# Patient Record
Sex: FEMALE | Race: WHITE
[De-identification: names, ages, dates, MRNs, and addresses within clinical notes are randomized per-mention and may not be internally consistent; named-entity substitution may affect disease eponyms.]

---

## 2017-06-15 ENCOUNTER — HOSPITAL ENCOUNTER (OUTPATIENT)
Dept: HOSPITAL 75 - RAD | Age: 52
End: 2017-06-15
Attending: NURSE PRACTITIONER
Payer: COMMERCIAL

## 2017-06-15 DIAGNOSIS — M79.661: Primary | ICD-10-CM

## 2017-06-15 NOTE — DIAGNOSTIC IMAGING REPORT
EXAMINATION: Right lower extremity duplex venous ultrasound.



TECHNIQUE: DVT protocol. Multiple sonographic images with color

Doppler and waveform interrogation were performed of the right

lower extremity veins with compression and augmentation

maneuvers.



INDICATION: Right leg pain.



FINDINGS: The right lower extremity veins from the groin to below

the knee veins were examined with normal color-flow,

compressibility and normal waveform demonstrated. The great

saphenous vein is patent.

    



IMPRESSION: 

No evidence of DVT in the right lower extremity.    



Dictated by: 



  Dictated on workstation # [id number]

## 2017-07-19 ENCOUNTER — HOSPITAL ENCOUNTER (OUTPATIENT)
Dept: HOSPITAL 75 - RAD | Age: 52
End: 2017-07-19
Attending: NURSE PRACTITIONER
Payer: COMMERCIAL

## 2017-07-19 DIAGNOSIS — N85.2: Primary | ICD-10-CM

## 2017-07-19 DIAGNOSIS — R10.31: ICD-10-CM

## 2017-07-19 PROCEDURE — 76856 US EXAM PELVIC COMPLETE: CPT

## 2017-07-19 PROCEDURE — 76830 TRANSVAGINAL US NON-OB: CPT

## 2017-07-19 NOTE — DIAGNOSTIC IMAGING REPORT
Transabdominal and transvaginal pelvic ultrasound.



INDICATION: Right lower quadrant pain.



FINDINGS:

The uterus is 4.4 x 3.4 x 2.6 CM. The endometrial stripe is 0.6

CM in thickness. No internal vascularity seen. No focal

myometrial mass is noted. The patient  3 years postmenopausal.



The ovaries are not seen, likely obscured by bowel gas and  small

in size. The urinary bladder appears unremarkable.



IMPRESSION: There is endometrial thickening measuring 0.6 CM.

Underlying uterine hyperplasia, carcinoma or polyp could be

present. Correlate clinically and with endometrial biopsy is

needed. 



Report was called to Milagro Wilkins by ghassan at 11:45 am.



Dictated by: 



  Dictated on workstation # [id number]

## 2017-12-21 ENCOUNTER — HOSPITAL ENCOUNTER (OUTPATIENT)
Dept: HOSPITAL 75 - RAD | Age: 52
End: 2017-12-21
Attending: NURSE PRACTITIONER
Payer: COMMERCIAL

## 2017-12-21 DIAGNOSIS — I82.A11: Primary | ICD-10-CM

## 2017-12-21 DIAGNOSIS — I82.B11: ICD-10-CM

## 2017-12-21 NOTE — DIAGNOSTIC IMAGING REPORT
EXAMINATION: Right upper extremity venous vascular duplex

ultrasound.



INDICATION: Right upper extremity pain.



FINDINGS:

The right internal jugular vein is patent and compressible. The

right subclavian vein demonstrate complete occlusive thrombosis

with thrombus extension into the mid axillary vein, with total 

length of about 10 CM. The brachial vein is patent. The basilic,

the radial, the ulnar and cephalic veins are patent.



IMPRESSION: Occlusive DVT involving the right subclavian vein

extending to the mid axillary vein.



 The findings were discussed personally with Dr. Manjit Gonsales

at 10 AM.



Dictated by: 



  Dictated on workstation # [id number]

## 2017-12-23 ENCOUNTER — HOSPITAL ENCOUNTER (EMERGENCY)
Dept: HOSPITAL 75 - ER | Age: 52
Discharge: HOME | End: 2017-12-23
Payer: COMMERCIAL

## 2017-12-23 VITALS — SYSTOLIC BLOOD PRESSURE: 123 MMHG | DIASTOLIC BLOOD PRESSURE: 87 MMHG

## 2017-12-23 VITALS — BODY MASS INDEX: 20.66 KG/M2 | WEIGHT: 121 LBS | HEIGHT: 64 IN

## 2017-12-23 DIAGNOSIS — E06.3: ICD-10-CM

## 2017-12-23 DIAGNOSIS — Z79.01: ICD-10-CM

## 2017-12-23 DIAGNOSIS — I26.99: Primary | ICD-10-CM

## 2017-12-23 DIAGNOSIS — I82.B11: ICD-10-CM

## 2017-12-23 LAB
ALBUMIN SERPL-MCNC: 3.9 GM/DL (ref 3.2–4.5)
ALT SERPL-CCNC: 24 U/L (ref 0–55)
ANION GAP SERPL CALC-SCNC: 9 MMOL/L (ref 5–14)
AST SERPL-CCNC: 27 U/L (ref 5–34)
BASOPHILS # BLD AUTO: 0 10^3/UL (ref 0–0.1)
BASOPHILS NFR BLD AUTO: 0 % (ref 0–10)
BILIRUB SERPL-MCNC: 0.6 MG/DL (ref 0.1–1)
BUN SERPL-MCNC: 12 MG/DL (ref 7–18)
BUN/CREAT SERPL: 16
CALCIUM SERPL-MCNC: 9.4 MG/DL (ref 8.5–10.1)
CHLORIDE SERPL-SCNC: 106 MMOL/L (ref 98–107)
CO2 SERPL-SCNC: 27 MMOL/L (ref 21–32)
CREAT SERPL-MCNC: 0.75 MG/DL (ref 0.6–1.3)
EOSINOPHIL # BLD AUTO: 0.1 10^3/UL (ref 0–0.3)
EOSINOPHIL NFR BLD AUTO: 1 % (ref 0–10)
ERYTHROCYTE [DISTWIDTH] IN BLOOD BY AUTOMATED COUNT: 11.4 % (ref 10–14.5)
GFR SERPLBLD BASED ON 1.73 SQ M-ARVRAT: > 60 ML/MIN
GLUCOSE SERPL-MCNC: 101 MG/DL (ref 70–105)
LYMPHOCYTES # BLD AUTO: 1.4 X 10^3 (ref 1–4)
LYMPHOCYTES NFR BLD AUTO: 24 % (ref 12–44)
MCH RBC QN AUTO: 33 PG (ref 25–34)
MCHC RBC AUTO-ENTMCNC: 35 G/DL (ref 32–36)
MCV RBC AUTO: 94 FL (ref 80–99)
MONOCYTES # BLD AUTO: 0.5 X 10^3 (ref 0–1)
MONOCYTES NFR BLD AUTO: 9 % (ref 0–12)
NEUTROPHILS # BLD AUTO: 4 X 10^3 (ref 1.8–7.8)
NEUTROPHILS NFR BLD AUTO: 66 % (ref 42–75)
PLATELET # BLD: 253 10^3/UL (ref 130–400)
PMV BLD AUTO: 8.9 FL (ref 7.4–10.4)
POTASSIUM SERPL-SCNC: 4 MMOL/L (ref 3.6–5)
PROT SERPL-MCNC: 6.4 GM/DL (ref 6.4–8.2)
RBC # BLD AUTO: 4.45 10^6/UL (ref 4.35–5.85)
SODIUM SERPL-SCNC: 142 MMOL/L (ref 135–145)
TROPONIN I SERPL-MCNC: < 0.3 NG/ML (ref ?–0.3)
WBC # BLD AUTO: 6 10^3/UL (ref 4.3–11)

## 2017-12-23 PROCEDURE — 71275 CT ANGIOGRAPHY CHEST: CPT

## 2017-12-23 PROCEDURE — 84484 ASSAY OF TROPONIN QUANT: CPT

## 2017-12-23 PROCEDURE — 85025 COMPLETE CBC W/AUTO DIFF WBC: CPT

## 2017-12-23 PROCEDURE — 93005 ELECTROCARDIOGRAM TRACING: CPT

## 2017-12-23 PROCEDURE — 80053 COMPREHEN METABOLIC PANEL: CPT

## 2017-12-23 PROCEDURE — 36415 COLL VENOUS BLD VENIPUNCTURE: CPT

## 2017-12-23 NOTE — XMS REPORT
Megan Díaz DO, FACP Clinical Summary

 Created on: 2015



Marizol Dewitt

External Reference #: [id number]

: 1965

Sex: Female



Demographics







 Address  [address] [place]  [address]

 

 Home Phone  [phone]

 

 Preferred Language  Unknown

 

 Marital Status  D

 

 Temple Affiliation  Unknown

 

 Race  White

 

 Ethnic Group  Not  or 





Author







 Author  User, [employer]  Megan Díaz DO, JAVIERP

 

 Address  Unknown

 

 Phone  [phone]







Allergies, Adverse Reactions, Alerts







 Allergy Name  Reaction Description  Start Date  Severity  Status  Provider

 

 PENICILLIN  Rash    Critical  Active  Megan Díaz







Conditions or Problems







 Problem Name  Problem Code  Onset Date  Status  Entry Date  Provider  Comment  
Standard Description  Annotate

 

 UNSPECIFIED PERIPHERAL VASCULAR DISEASE  443.9    Resolved    Megan Díaz     Peripheral vascular disease, unspecified   

 

 WELL WOMAN  V70.0    Resolved    Megan Díaz     
Routine general medical examination at a health care facility   

 

 RAYNAUD'S SYNDROME  443.0    Active    Megan Díaz     Raynaud's syndrome   

 

 AUTOIMMUNE DISEASE NOT ELSEWHERE  CLASSIFIED  279.49    Resolved  
  Megan Díaz     Autoimmune disease, not elsewhere 
classified   

 

 POLYARTHRALGIA  719.49    Active    Megan Díaz  
   Pain in joint involving multiple sites   

 

 HYPERCHOLESTEROLEMIA  272.0    Active    Megan Díaz     Pure hypercholesterolemia   

 

 NEUROPATHY, IDIOPATHIC PERIPHERAL  356.9    Active    
Megan Díaz     Unspecified idiopathic peripheral neuropathy   

 

 VITAMIN B12 DEFICIENCY  266.2    Resolved    Megan Díaz     Other B-complex deficiencies   

 

 RHEUMATOID ARTHRITIS  714.0    Resolved    Megan Díaz     Rheumatoid arthritis   

 

 BRONCHITIS  490    Resolved    Megan Díaz     
Bronchitis, not specified as acute or chronic   

 

 CONNECTIVE TISSUE DISEASE, UNDIFFERENTIATED  710.9    Active    Megan Díaz     Unspecified diffuse connective tissue disease   







Medication List







 Medication  Instructions  Start Date  Stop Date  Generic Name  NDC  Status  
Provider  Patient Instruction

 

 ESTRIOL/ESTRADIOL (80/20%) 0.3MG/0.1ML CREAM  APPLY 0.5ML TOPICALLY EVERY DAY  
     ESTRIOL/ESTRADIOL (80/20%) 0.3MG/0.1ML CREAM     Active  Megan Díaz   

 

 PROGESTERONE CREAM 60MG/GM  APPLY ONE HALF ML TOPICALLY AT HS AS DIRECTED  2015     PROGESTERONE CREAM 60MG/GM     Active  Megan Díaz   

 

 TESTOSTERONE 0.2%/JASON CREAM  APPLY 0.25 ML TOPICALLY ONE DAILY AS DIRECTED  
     TESTOSTERONE 0.2%/JASON CREAM     Active  Megan Díaz   

 

 CLARITIN-D 12 HOUR XR12H-TAB  1 PO BID PRN       LORATADINE-
PSEUDOEPHEDRINE [id number]  [id number]  Active  Megandrake Díaz   

 

 MULTIVITAMINS  TABS  1 po daily    MULTIPLE VITAMIN  
[id number]  No Longer Active  Megan Jahaira Díaz   

 

 PLAQUENIL 200 MG TAB  1.5 po QD       HYDROXYCHLOROQUINE SULFATE  
[id number]  No Longer Active  Megandrake Díaz   

 

 NABUMETONE 750 MG TABS  1 PO BID       NABUMETONE  [id number]  No 
Longer Active  Megandrake Díaz   

 

 NIFEDIPINE 30 MG XR24H-TAB  1 PO daily    NIFEDIPINE  
[id number]  No Longer Active  Megan Jahaira Díaz   

 

 TUSSIONEX PENNKINETIC ER 8-10 MG/5ML LQCR  1 tsp PO BID prn cough    CHLORPHENIRAMINE-HYDROCODONE  [id number]  No Longer Active  Megan 
Jahaira Díaz   

 

 KEFLEX 500 MG CAP  1 PO TID for 7 days    2011/01/10  CEPHALEXIN  
[id number]  No Longer Active  Megan Jahaira Díaz   

 

 TUSSIONEX PENNKINETIC ER 8-10 MG/5ML LQCR  1 tsp PO TID prn cough    
2011/01/10  CHLORPHENIRAMINE-HYDROCODONE  [id number]  No Longer Active  Megan 
Jahaira Díaz   

 

 FELDENE 10 MG CAPS  2 PO daily at night        PIROXICAM  [id number]  No 
Longer Active  Megandrake Florese Bre   

 

 ALPRAZOLAM 0.25 MG TABS  1 po prn anxiety       ALPRAZOLAM  
[id number]  Active  Megan Díaz   







Vital Signs







 Date  Name  Value  Unit  Range  Description

 

   blood pressure, diastolic - 8462-4  60  mm[Hg]     BP pan[place]

 

   blood pressure, systolic - 8480-6  120  mm[Hg]     BP sys

 

   pulse rate E&M - 8867-4  60  /min     Heart rate

 

   respiratory rate E&M - 9279-1  14  /min     Resp rate

 

   weight E&M - 3141-9  120  [lb_av]     Weight Measured







Diagnostic Results







 Date  Name  Value  Unit  Range  Description

 

 Clinical Lists Update: CBC,CMP,ESR,Ferritin,FLP - Chemistry 

 

   urea nitrogen, blood  13  mg/dL      

 

   alanine aminotransferase (SGPT), serum  21  U/L      

 

   protein, total, serum  6.7  g/dL      

 

   glucose, plasma fasting  91  mg/dL      

 

   triglyceride, serum, fasting  204  mg/dL      

 

   alkaline phosphatase, serum  43  U/L      

 

   potassium, serum  3.9  mmol/L      

 

   sodium, serum  137  mmol/L      

 

   calcium, serum  9.4  mg/dL      

 

   aspartate aminotransferase (SGOT), serum  24  U/L      

 

   chloride, serum  100  mmol/L      

 

   Estimated Glomerular Filtration Rate (calc)  73  mL/min/1.73m2    
  

 

   cholesterol, serum  258  mg/dL      

 

   albumin, serum  4.5  g/dL      

 

   carbon dioxide, venous blood  27  mmol/L      

 

   ferritin, serum  15  ng/mL      

 

   creatinine, serum  0.92  mg/dL      

 

   bilirubin, serum, total  0.6  mg/dL      

 

 Clinical Lists Update: CBC,CMP,ESR,Ferritin,FLP - Hematology 

 

   hematocrit, blood  42.1  %      

 

   erythrocyte (RBC) count  4.25  10*6/mm3      

 

   platelet count  304  10*3/mm3      

 

   erythrocyte sedimentation rate  9  mm/h      

 

   leukocyte count, blood  5.5  10*3/mm3      

 

   hemoglobin, blood  14.2  g/dL      

 

   red blood cell distribution width  13.0  %      

 

   mean corpuscular volume, RBC  99.0  fL      

 

 Clinical Lists Update: CBC,CMP,TSH,FREE T4,HGA1C,FERRITIN - Chemistry 

 

   albumin, serum  4.1  g/dL      

 

   alkaline phosphatase, serum  42  U/L      

 

   urea nitrogen, blood  17  mg/dL      

 

   calcium, serum  8.9  mg/dL      

 

   chloride, serum  101  mmol/L      

 

   creatinine, serum  0.96  mg/dL      

 

   ferritin, serum  13  ng/mL      

 

   thyroxine, serum, free  1.3  ng/dL      

 

   hemoglobin A1C, blood, as % of total hemoglobin  5.3  %      

 

   thyroid stimulating hormone, serum  1.35  u[iU]/mL      

 

   potassium, serum  4.4  mmol/L      

 

   protein, total, serum  6.4  g/dL      

 

   aspartate aminotransferase (SGOT), serum  19  U/L      

 

   alanine aminotransferase (SGPT), serum  15  U/L      

 

   bilirubin, serum, total  0.6  mg/dL      

 

   sodium, serum  135  mmol/L      

 

   glucose, plasma fasting  79  mg/dL      

 

   Estimated Glomerular Filtration Rate (calc)  105  mL/min/1.73m2   
   

 

   carbon dioxide, venous blood  25  mmol/L      

 

 Clinical Lists Update: CBC,CMP,TSH,FREE T4,HGA1C,FERRITIN - Hematology 

 

   erythrocyte (RBC) count  4.04  10*6/mm3      

 

   hematocrit, blood  41.0  %      

 

   red blood cell distribution width  12.4  %      

 

   leukocyte count, blood  4.9  10*3/mm3      

 

   platelet count  298  10*3/mm3      

 

   mean corpuscular volume, RBC  101.4  fL      

 

   hemoglobin, blood  13.9  g/dL      

 

 Clinical Lists Update: ESR - Hematology 

 

   erythrocyte sedimentation rate  2  mm/h      

 

 Clinical Lists Update: [facility]  - Chemistry 

 

   cholesterol/HDL ratio, serum, percent  2.4         

 

   triglyceride, serum, fasting  84  mg/dL      

 

   HDL cholesterol, serum  105  mg/dL      

 

   cholesterol, serum  254  mg/dL      

 

   LDL cholesterol, serum  132  mg/dL      







Encounters







 Code  Encounter  Date  Provider  Facility

 

 CPT-68616  Ofc Vst, Est Level IV   17:15:07 CDT  Megan Díaz, DO, FACP

 

 CPT-82224  Ofc Vst, Est Level IV   17:13:37 CDT  Megan Díaz, DO, FACP

 

 CPT-00261  Ofc Vst, Est Level III   12:50:11 CST  Megan Díaz, DO, FACP

 

 CPT-31486  Ofc Vst, Est Level III   10:07:15 CST  Megan Díaz, DO, FACP

 

 CPT-57026  Ofc Vst, Est Level IV   11:04:36 CST  Megan Díaz, DO, FACP

 

 CPT-13993  Ofc Vst, Est Level IV   11:27:21 CST  Megan Díaz, DO, FACP

 

 CPT-82209  Ofc Vst, New Level IV   15:18:55 CST  Megan Díaz, DO, FACP

## 2017-12-23 NOTE — ED CHEST PAIN
General


Chief Complaint:  Chest Pain


Stated Complaint:  CP AND SOB, DVT


Nursing Triage Note:  


ARRIVED VIA AMB TO ROOM 05.  PT HAS A KNOWN RIGHT SUBCLAVIAN BLOOD CLOT THAT 

SHE 


IS TAKING XERELTO FOR.  PT PRESENTS TODAY WITH CHEST PAIN AND SOA STARTING 


YESTERDY.


Nursing Sepsis Screen:  No Definite Risk


Source:  patient, RN/MD, other


Exam Limitations:  no limitations





History of Present Illness


Time seen by provider:  11:10


Initial Comments


Patient presents to ER by private conveyance with chief complaint that she is 

having some chest pain today. 2 days ago she was having some right arm swelling 

and pain and so she went to her primary care doctor who diagnosed her with a 

subclavian thrombosis on the right side and put her on Xarelto. The swelling 

has already improved but her pain has worsened today. Spoke with her primary 

care physician and he was concerned she might be experiencing a pulmonary 

embolism. She's having some shortness of breath. She is on hormone replacement 

therapy but does not smoke nor have any history of trauma recently. She has no 

personal history of cancer and she says her Pap smear, mammogram and 

colonoscopy are all up-to-date. She has no unexpected weight change. 


Discussed the case with Dr. Gonsales her primary care physician and he says 

that she has now had protein CNS, factor V Leiden other labs drawn and sent 

off. His thoughts were thoracic syndrome versus pulmonary embolism and if it is 

a PE there are certainly some invasive thrombectomy that may be done at at 

tertiary center so we should prove it and her out even though her vitals are 

not otherwise affected. His feeling is that the estrogen therapy may play a 

part in all his meds why he is asked her to stop that. He says she should be on 

day 3 of her Xarelto so is not exactly certain whether to call Xarelto failure 

if she did develop pulmonary embolism.





Allergies and Home Medications


Allergies


Coded Allergies:  


     Penicillins (Unverified  Allergy, Unknown, RASH, 17)





Home Medications


Alprazolam 0.25 Mg Tablet, (Reported)


Dextroamphetamine/Amphetamine 20 Mg Tablet, (Reported)


Loratadine/Pseudoephedrine 1 Each Tab.er.24h, (Reported)


Progesterone,Micronized 200 Mg Capsule, (Reported)


Thyroid,Pork 60 Mg Tablet, (Reported)





Review of Systems


Constitutional:  No chills, No diaphoresis


EENTM:  No Blurred Vision, No Eye Pain


Respiratory:  Denies Cough, Shortness of Air


Cardiovascular:  See HPI, Chest Pain, Denies Lightheadedness, Denies 

Palpitations, Denies Syncope


Gastrointestinal:  Denies Abdomen Distended, Denies Abdominal Pain


Genitourinary:  Denies Burning, Denies Discharge


Musculoskeletal:  No back pain, No joint pain


Skin:  No pruritus, No rash


Psychiatric/Neurological:  Denies Headache, Denies Numbness





Past Medical-Social-Family Hx


Patient Social History


Alcohol Use:  Denies Use


Recreational Drug Use:  No


Smoking Status:  Never a Smoker


Recent Foreign Travel:  No


Contact w/Someone Who Travel:  No


Recent Infectious Disease Expo:  No





Immunizations Up To Date


Date of Influenza Vaccine:  2011





Respiratory


History of Respiratory Disorde:  No





Cardiovascular


History of Cardiac Disorders:  No





Neurological


History of Neurological Disord:  No





Reproductive System


Hx Reproductive Disorders:  Yes (MENORRHAGIA)





Genitourinary


History of Genitourinary Disor:  No





Gastrointestinal


History of Gastrointestinal Di:  No





Musculoskeletal


History of Musculoskeletal Dis:  Yes (CONNECTIVE TISSUE DISEASE)





Endocrine


History of Endocrine Disorders:  Yes (HASHIMOTOS)





Psychosocial


History of Psychiatric Problem:  No





Integumentary


History of Skin or Integumenta:  No





Blood Transfusions


History of Blood Disorders:  No





Physical Exam


Vital Signs





Vital Sign - Last 12Hours








 17





 11:02


 


Temp 98.0


 


Pulse 71


 


Resp 18


 


B/P (MAP) 126/82 (97)


 


Pulse Ox 99


 


O2 Delivery Room Air





Capillary Refill : Less Than 3 Seconds


General Appearance:  No Apparent Distress, WD/WN, Anxious


HEENT:  PERRL/EOMI, Pharynx Normal, Moist Mucous Membranes


Neck:  Full Range of Motion, Supple


Respiratory:  Chest Non Tender, Lungs Clear, Normal Breath Sounds, No Accessory 

Muscle Use, No Respiratory Distress


Cardiovascular:  Regular Rate, Rhythm, No Edema, No Gallop, No Murmur, Normal 

Peripheral Pulses


Gastrointestinal:  Non Tender, Soft


Neurologic/Psychiatric:  Alert, Oriented x3


Skin:  Normal Color, Warm/Dry





Progress/Results/Core Measures


Results/Orders


Lab Results





Laboratory Tests








Test


  17


11:44 Range/Units


 


 


White Blood Count


  6.0 


  4.3-11.0


10^3/uL


 


Red Blood Count


  4.45 


  4.35-5.85


10^6/uL


 


Hemoglobin 14.7  11.5-16.0  G/DL


 


Hematocrit 42  35-52  %


 


Mean Corpuscular Volume 94  80-99  FL


 


Mean Corpuscular Hemoglobin 33  25-34  PG


 


Mean Corpuscular Hemoglobin


Concent 35 


  32-36  G/DL


 


 


Red Cell Distribution Width 11.4  10.0-14.5  %


 


Platelet Count


  253 


  130-400


10^3/uL


 


Mean Platelet Volume 8.9  7.4-10.4  FL


 


Neutrophils (%) (Auto) 66  42-75  %


 


Lymphocytes (%) (Auto) 24  12-44  %


 


Monocytes (%) (Auto) 9  0-12  %


 


Eosinophils (%) (Auto) 1  0-10  %


 


Basophils (%) (Auto) 0  0-10  %


 


Neutrophils # (Auto) 4.0  1.8-7.8  X 10^3


 


Lymphocytes # (Auto) 1.4  1.0-4.0  X 10^3


 


Monocytes # (Auto) 0.5  0.0-1.0  X 10^3


 


Eosinophils # (Auto)


  0.1 


  0.0-0.3


10^3/uL


 


Basophils # (Auto)


  0.0 


  0.0-0.1


10^3/uL


 


Sodium Level 142  135-145  MMOL/L


 


Potassium Level 4.0  3.6-5.0  MMOL/L


 


Chloride Level 106    MMOL/L


 


Carbon Dioxide Level 27  21-32  MMOL/L


 


Anion Gap 9  5-14  MMOL/L


 


Blood Urea Nitrogen 12  7-18  MG/DL


 


Creatinine


  0.75 


  0.60-1.30


MG/DL


 


Estimat Glomerular Filtration


Rate > 60 


   


 


 


BUN/Creatinine Ratio 16   


 


Glucose Level 101    MG/DL


 


Calcium Level 9.4  8.5-10.1  MG/DL


 


Total Bilirubin 0.6  0.1-1.0  MG/DL


 


Aspartate Amino Transf


(AST/SGOT) 27 


  5-34  U/L


 


 


Alanine Aminotransferase


(ALT/SGPT) 24 


  0-55  U/L


 


 


Alkaline Phosphatase 52    U/L


 


Troponin I < 0.30  <0.30  NG/ML


 


Total Protein 6.4  6.4-8.2  GM/DL


 


Albumin 3.9  3.2-4.5  GM/DL








My Orders





Orders - EDSON ADAME


Ekg Tracing (17 11:05)


Continuous Ekg Monitoring (17 11:05)


Ct Angio Chest W (17 11:31)


Saline Lock/Iv-Start (17 11:31)


Ns Iv 1000 Ml (Sodium Chloride 0.9%) (17 11:31)


Cbc With Automated Diff (17 11:31)


Comprehensive Metabolic Panel (17 11:31)


Troponin I (17 11:31)


Iohexol Injection (Omnipaque 350 Mg/Ml 1 (17 12:15)


Ns (Ivpb) (Sodium Chloride 0.9% Ivpb Bag (17 12:15)





Medications Given in ED





Current Medications








 Medications  Dose


 Ordered  Sig/Johnathon


 Route  Start Time


 Stop Time Status Last Admin


Dose Admin


 


 Iohexol  150 ml  ONCE  ONCE


 IV  17 12:15


 17 12:17 DC 17 12:20


125 ML


 


 Sodium Chloride  100 ml  ONCE  ONCE


 IV  17 12:15


 17 12:17 DC 17 12:21


80 ML


 


 Sodium Chloride  1,000 ml @ 


 0 mls/hr  Q0M ONCE


 IV  17 11:31


 17 11:33 DC 17 11:42


1,000 MLS/HR








Vital Signs/I&O





Vital Sign - Last 12Hours








 17





 11:02


 


Temp 98.0


 


Pulse 71


 


Resp 18


 


B/P (MAP) 126/82 (97)


 


Pulse Ox 99


 


O2 Delivery Room Air














Blood Pressure Mean:  97








Progress Note :  


   Time:  13:48


Progress Note


Patient is on day 3 of her loading dose of Xarelto so despite having pulmonary 

embolisms is not immediately necessary does heparinize her. We will discuss 

anticoagulation as well as possible embolectomy with CT surgery.





ECG


Initial ECG Impression Date:  Dec 23, 2017


Initial ECG Impression Time:  11:06


Initial ECG Rate:  74


Initial ECG Rhythm:  Normal Sinus


Initial ECG Intervals:  Normal


Initial ECG Impression:  Normal, Nonspecific Changes


Initial ECG Comparisson:  No Previous ECG Available


Comment


No T-wave elevation or depression.





Diagnostic Imaging





   Diagonstic Imaging:  CT (angio)


   Plain Films/CT/US/NM/MRI:  chest


Comments


Pulmonary embolisms bilateral lower lungs. 6 mm nodule needing follow-up.


 VIA [place], [place]





NAME:   ANATOLY GONZALES


MED REC#:   [id number]


ACCOUNT#:   [id number]


PT STATUS:   REG ER


:   1965


PHYSICIAN:   EDSON ADAME MD


ADMIT DATE:   17/ER


 ***Draft***


Date of Exam:17





CT ANGIO CHEST W








CLINICAL INDICATION: Followup DVT of right subclavian vein.


Patient has chest pain with coughing.





EXAM: CT angiogram of the chest performed with 125  cc Omnipaque


350 IV contrast. Coronal and oblique MIP images of the


vasculature were created to better evaluate anatomy.  





COMPARISON: None.





FINDINGS:


There is contrast bolus within the left subclavian vein, left


innominate vein, and superior vena cava which causes streak


artifact obscuring portions of the aortic arch and great vessels.


Visualized portions of the right subclavian artery, bilateral CCA


and bilateral cervical vertebral arteries are patent. The right


subclavian vein has no contrast within it to evaluate.





There are partially occlusive intravascular thrombi seen within


the posterior medial subsegmental branch of the right lower lobe


pulmonary artery. There is also a partially occlusive


intravascular thrombus within the anterior subsegmental branch of


the left lower lobe. There is no thoracic aortic dissection or


aneurysm. 





There is mild dependent atelectasis involving the posterior


aspects of both lungs. There is a 6 mm soft tissue nodule


involving the lateral aspect of right lower lobe seen on series


4, image 84. Otherwise, the remainder of the lungs are clear.


Pulmonary bronchi are unremarkable. The thyroid gland is


unremarkable as visualized. There is no significant mediastinal


or hilar lymphadenopathy. There is no axillary lymphadenopathy.


Bilateral subglandular breast implants are seen.





The visualized portions of the upper abdominal structures are


unremarkable. Bone show no significant acute process.





IMPRESSION:


1:  There are partially occlusive pulmonary emboli/intravascular


thrombus within the subsegmental branches of the right and left


lower lobes.





2: There is a 6 mm nodule within the lateral aspect of right


lower lobe. Followup chest CT scan in six months is suggested to


evaluate for stability or resolution.





Results of this report were discussed with Dr. Edson Adame via


the telephone on 2017 at 1245 hrs.





CRITICAL FINDING

















  Dictated on workstation # [id number]








Dict:   17 1239


Trans:   17 1257


KB [id number]





Interpreted by:     RAMSEY SHAFFER MD


Electronically signed by:


   Reviewed:  Reviewed by Me





Consults


Consults :  


Consults Notes


[facility]: CT Surgeon consult


Triage coordinator 1315: Will have a Triage RN call you back. 


1345: Checked back in. 


Triage RN: 1405: Will have the physician review the images and then call us 

back.


1455: Dr Gleason: He feels that the clots are in the acute phase when a 

migration from the subclavian thrombus is most likely so he would consider this 

a failure of the Xarelto and would recommend we switch her to develop this as 

well as cover her until you get up to therapeutic dosing with Lovenox 1 mg/kg 

twice a day. He would also suggested we check a factor X a level in 2-3 days on 

the Lovenox. He would continue treatment with L acquits for 3-6 months. 3 

months as long as she was asymptomatic for the last month area longer she has 

any problems. As far as the nodule; it is too small for any kind of diagnostic 

imaging or biopsy so he would repeat a CT scan in 6-12 months a stoma level of 

the patient's anxiety. Also he feels that since the patient is a lifelong 

nonsmoker that adenocarcinoma would be the most likely cancer and this is often 

as much as 20% false-negative on PET scans.


Because the patient has normal vitals and is otherwise asymptomatic except for 

mild shortness of breath and mild chest pain he recommends against TPA or 

embolectomy as the risks outweigh the benefits.





Departure


Communication (PCP)


Discussed case lab imaging findings and Dr. Gleason's recommendations with the 

PCP. Discussed the need for repeat CT scan in 612 months as well as check after 

10 a levels in 2-3 days on the Lovenox. Discussed anticoagulation strategy. 

Discussed getting the CT scans that are already planned to workup vena cava 

compression or thoracic outlet syndrome.





Impression


Impression:  


 Primary Impression:  


 Pulmonary embolism


 Qualified Codes:  I26.99 - Other pulmonary embolism without acute cor pulmonale


 Additional Impression:  


 Acute embolism from right subclavian vein


Disposition:  01 HOME, SELF-CARE


Condition:  Stable





Departure-Patient Inst.


Decision time for Depature:  15:17


Referrals:  


RONNY GONSALES DO (PCP)


Primary Care Physician








CATARINO GONSALES, MYA (Family)


Primary Care Physician


Patient Instructions:  Pulmonary Embolism (Blood Clot in the Lungs) (DC)





Add. Discharge Instructions:  


Make sure drinking plenty of water and obtain the Lovenox to be taken 55 mg 

subcutaneously twice a day. 2-3 days after he start the Lovenox you should 

follow up with your primary care physician to have blood drawn for a factor X a 

level.


Start the Eliquis tonight and take 2 capsules twice a day for 7 days. Then take 

one capsule twice a day. Plan to follow up with your primary care physician in 

one to 2 weeks. 


Plan to repeat a CT scan in 6-12 months for the right pulmonary nodule seen 

today on CT scan.


Return to the ER if you have acute shortness of breath, chest pain or worsening 

symptoms.








All discharge instructions reviewed with patient and/or family. Voiced 

understanding.


Scripts


Apixaban (Eliquis) 5 Mg Tablet


5 MG PO BID for 30 Days, #60 TAB 0 Refills


   Prov: EDSON ADAME         17 


Apixaban (Eliquis) 5 Mg Tablet


10 MG PO BID for 7 Days, #28 TAB 0 Refills


   2 TABLETS TWICE DAILY X 7 DAYS


   Prov: EDSON ADAME         17 


Enoxaparin Sodium (Lovenox) 60 Mg/0.6 Ml Syringe


55 MG SQ Q12H for 14 Days, #28 SYRINGE 0 Refills


   Prov: EDSON ADAME         17





Copy


Copies To 1:   RONNY GONSALES DO











EDSON ADAME Dec 23, 2017 11:27

## 2017-12-23 NOTE — XMS REPORT
Megan Díaz DO, FACP Clinical Summary

 Created on: 2015



Marizol Dewitt

External Reference #: [id number]

: 1965

Sex: Female



Demographics







 Address  [address] [place]  [address]

 

 Home Phone  [email or web address]

 

 Preferred Language  Unknown

 

 Marital Status  D

 

 Sikhism Affiliation  Unknown

 

 Race  White

 

 Ethnic Group  Not  or 





Author







 Author  User, N4MD  Megan Díaz DO, FACP

 

 Address  Unknown

 

 Phone  [phone]







Allergies, Adverse Reactions, Alerts







 Allergy Name  Reaction Description  Start Date  Severity  Status  Provider

 

 PENICILLIN  Rash    Critical  Active  Megan Díaz







Conditions or Problems







 Problem Name  Problem Code  Onset Date  Status  Entry Date  Provider  Comment  
Standard Description  Annotate

 

 UNSPECIFIED PERIPHERAL VASCULAR DISEASE  443.9    Resolved    Megan Díaz     Peripheral vascular disease, unspecified   

 

 WELL WOMAN  V70.0    Resolved    Megan Díaz     
Routine general medical examination at a health care facility   

 

 RAYNAUD'S SYNDROME  443.0    Active    Megan Díaz     Raynaud's syndrome   

 

 AUTOIMMUNE DISEASE NOT ELSEWHERE  CLASSIFIED  279.49    Resolved  
  Megan Díaz     Autoimmune disease, not elsewhere 
classified   

 

 POLYARTHRALGIA  719.49    Active    Megan Díaz  
   Pain in joint involving multiple sites   

 

 HYPERCHOLESTEROLEMIA  272.0    Active    Megan Díaz     Pure hypercholesterolemia   

 

 NEUROPATHY, IDIOPATHIC PERIPHERAL  356.9    Active    
Meagn Díaz     Unspecified idiopathic peripheral neuropathy   

 

 VITAMIN B12 DEFICIENCY  266.2    Resolved    Megan Díaz     Other B-complex deficiencies   

 

 RHEUMATOID ARTHRITIS  714.0    Resolved    Megan Díaz     Rheumatoid arthritis   

 

 BRONCHITIS  490    Resolved    Megan Díaz     
Bronchitis, not specified as acute or chronic   

 

 CONNECTIVE TISSUE DISEASE, UNDIFFERENTIATED  710.9    Active    Megan Díaz     Unspecified diffuse connective tissue disease   







Medication List







 Medication  Instructions  Start Date  Stop Date  Generic Name  NDC  Status  
Provider  Patient Instruction

 

 ESTRIOL/ESTRADIOL (80/20%) 0.3MG/0.1ML CREAM  APPLY 0.5ML TOPICALLY EVERY DAY  
     ESTRIOL/ESTRADIOL (80/20%) 0.3MG/0.1ML CREAM     Active  Megandrake Díaz   

 

 PROGESTERONE CREAM 60MG/GM  APPLY ONE HALF ML TOPICALLY AT HS AS DIRECTED  2015     PROGESTERONE CREAM 60MG/GM     Active  Megan Díaz   

 

 TESTOSTERONE 0.2%/JASON CREAM  APPLY 0.25 ML TOPICALLY ONE DAILY AS DIRECTED  
     TESTOSTERONE 0.2%/JASON CREAM     Active  Megandrake Díaz   

 

 CLARITIN-D 12 HOUR XR12H-TAB  1 PO BID PRN       LORATADINE-
PSEUDOEPHEDRINE [id number]  [id number]  Active  Megandrake Díaz   

 

 MULTIVITAMINS  TABS  1 po daily    MULTIPLE VITAMIN  
[id number]  No Longer Active  Megan Jahaira Díaz   

 

 PLAQUENIL 200 MG TAB  1.5 po QD       HYDROXYCHLOROQUINE SULFATE  
[id number]  No Longer Active  Megan Jahaira Díaz   

 

 NABUMETONE 750 MG TABS  1 PO BID       NABUMETONE  [id number]  No 
Longer Active  Megan Jahaira Díaz   

 

 NIFEDIPINE 30 MG XR24H-TAB  1 PO daily    NIFEDIPINE  
[id number]  No Longer Active  Megan Jahaira Díaz   

 

 TUSSIONEX PENNKINETIC ER 8-10 MG/5ML LQCR  1 tsp PO BID prn cough    CHLORPHENIRAMINE-HYDROCODONE  [id number]  No Longer Active  Megan 
Jahaira Díaz   

 

 KEFLEX 500 MG CAP  1 PO TID for 7 days    2011/01/10  CEPHALEXIN  
[id number]  No Longer Active  Megan Jahaira Díaz   

 

 TUSSIONEX PENNKINETIC ER 8-10 MG/5ML LQCR  1 tsp PO TID prn cough    
2011/01/10  CHLORPHENIRAMINE-HYDROCODONE  [id number]  No Longer Active  Megan 
Jahaira Díaz   

 

 FELDENE 10 MG CAPS  2 PO daily at night        PIROXICAM  [id number]  No 
Longer Active  Megan Díaz   

 

 ALPRAZOLAM 0.25 MG TABS  1 po prn anxiety       ALPRAZOLAM  
[id number]  Active  Megan Jahaira Huiner   







Vital Signs







 Date  Name  Value  Unit  Range  Description

 

   blood pressure, diastolic - 8462-4  60  mm[Hg]     BP paniagua

 

   blood pressure, systolic - 8480-6  120  mm[Hg]     BP sys

 

   pulse rate E&M - 8867-4  60  /min     Heart rate

 

   respiratory rate E&M - 9279-1  14  /min     Resp rate

 

   weight E&M - 3141-9  120  [lb_av]     Weight Measured







Diagnostic Results







 Date  Name  Value  Unit  Range  Description

 

 Clinical Lists Update: CBC,CMP,TSH,FREE T4,HGA1C,FERRITIN - Chemistry 

 

   Estimated Glomerular Filtration Rate (calc)  105  mL/min/1.73m2   
   

 

   glucose, plasma fasting  79  mg/dL      

 

   albumin, serum  4.1  g/dL      

 

   alkaline phosphatase, serum  42  U/L      

 

   urea nitrogen, blood  17  mg/dL      

 

   calcium, serum  8.9  mg/dL      

 

   chloride, serum  101  mmol/L      

 

   carbon dioxide, venous blood  25  mmol/L      

 

   creatinine, serum  0.96  mg/dL      

 

   ferritin, serum  13  ng/mL      

 

   thyroxine, serum, free  1.3  ng/dL      

 

   hemoglobin A1C, blood, as % of total hemoglobin  5.3  %      

 

   thyroid stimulating hormone, serum  1.35  u[iU]/mL      

 

   potassium, serum  4.4  mmol/L      

 

   sodium, serum  135  mmol/L      

 

   bilirubin, serum, total  0.6  mg/dL      

 

   alanine aminotransferase (SGPT), serum  15  U/L      

 

   aspartate aminotransferase (SGOT), serum  19  U/L      

 

   protein, total, serum  6.4  g/dL      

 

 Clinical Lists Update: CBC,CMP,TSH,FREE T4,HGA1C,FERRITIN - Hematology 

 

   mean corpuscular volume, RBC  101.4  fL      

 

   hemoglobin, blood  13.9  g/dL      

 

   hematocrit, blood  41.0  %      

 

   red blood cell distribution width  12.4  %      

 

   leukocyte count, blood  4.9  10*3/mm3      

 

   erythrocyte (RBC) count  4.04  10*6/mm3      

 

   platelet count  298  10*3/mm3      

 

 Clinical Lists Update: ESR - Hematology 

 

   erythrocyte sedimentation rate  2  mm/h      

 

 Clinical Lists Update: [facility]  - Chemistry 

 

   cholesterol, serum  254  mg/dL      

 

   cholesterol/HDL ratio, serum, percent  2.4         

 

   LDL cholesterol, serum  132  mg/dL      

 

   triglyceride, serum, fasting  84  mg/dL      

 

   HDL cholesterol, serum  105  mg/dL      







Encounters







 Code  Encounter  Date  Provider  Facility

 

 CPT-67853  Ofc Vst, Est Level IV   17:15:07 CDT  Megan Díaz, DO, FACP

 

 CPT-84846  Ofc Vst, Est Level IV   17:13:37 CDT  Megan Díaz, DO, FACP

 

 CPT-02303  Ofc Vst, Est Level III   12:50:11 CST  Megan Díaz, DO, FACP

 

 CPT-71912  Ofc Vst, Est Level III   10:07:15 CST  Megan Díaz, DO, FACP

 

 CPT-53394  Ofc Vst, Est Level IV   11:04:36 CST  Megan Díaz, DO, FACP

 

 CPT-53915  Ofc Vst, Est Level IV   11:27:21 CST  Megan Díaz, DO, FACP

 

 CPT-84979  Ofc Vst, New Level IV   15:18:55 CST  Megan Díaz, DO, FACP

## 2017-12-23 NOTE — XMS REPORT
Megan Díaz DO, FACP Clinical Summary

 Created on: 2015



Marizol Dewitt

External Reference #: [id number]

: 1965

Sex: Female



Demographics







 Address  [address] [place]  [address]

 

 Home Phone  [email or web address]

 

 Preferred Language  Unknown

 

 Marital Status  D

 

 Rastafarian Affiliation  Unknown

 

 Race  White

 

 Ethnic Group  Not  or 





Author







 Author  User, [employer]  Megan Díaz DO, FACP

 

 Address  Unknown

 

 Phone  [phone]







Allergies, Adverse Reactions, Alerts







 Allergy Name  Reaction Description  Start Date  Severity  Status  Provider

 

 PENICILLIN  Rash    Critical  Active  Megan Díaz







Conditions or Problems







 Problem Name  Problem Code  Onset Date  Status  Entry Date  Provider  Comment  
Standard Description  Annotate

 

 UNSPECIFIED PERIPHERAL VASCULAR DISEASE  443.9    Resolved    Megan Díaz     Peripheral vascular disease, unspecified   

 

 WELL WOMAN  V70.0    Resolved    Megan Díaz     
Routine general medical examination at a health care facility   

 

 RAYNAUD'S SYNDROME  443.0    Active    Megan Díaz     Raynaud's syndrome   

 

 AUTOIMMUNE DISEASE NOT ELSEWHERE  CLASSIFIED  279.49    Resolved  
  Megan Díaz     Autoimmune disease, not elsewhere 
classified   

 

 POLYARTHRALGIA  719.49    Active    Megan Díaz  
   Pain in joint involving multiple sites   

 

 HYPERCHOLESTEROLEMIA  272.0    Active    Megan Díaz     Pure hypercholesterolemia   

 

 NEUROPATHY, IDIOPATHIC PERIPHERAL  356.9    Active    
Megan Díaz     Unspecified idiopathic peripheral neuropathy   

 

 VITAMIN B12 DEFICIENCY  266.2    Resolved    Megan Díaz     Other B-complex deficiencies   

 

 RHEUMATOID ARTHRITIS  714.0    Resolved    Megan Díaz     Rheumatoid arthritis   

 

 BRONCHITIS  490    Resolved    Megan Díaz     
Bronchitis, not specified as acute or chronic   

 

 CONNECTIVE TISSUE DISEASE, UNDIFFERENTIATED  710.9    Active    Megan Díaz     Unspecified diffuse connective tissue disease   







Medication List







 Medication  Instructions  Start Date  Stop Date  Generic Name  NDC  Status  
Provider  Patient Instruction

 

 ESTRIOL/ESTRADIOL (80/20%) 0.3MG/0.1ML CREAM  APPLY 0.5ML TOPICALLY EVERY DAY  
     ESTRIOL/ESTRADIOL (80/20%) 0.3MG/0.1ML CREAM     Active  Megandrake Díaz   

 

 PROGESTERONE CREAM 60MG/GM  APPLY ONE HALF ML TOPICALLY AT HS AS DIRECTED  2015     PROGESTERONE CREAM 60MG/GM     Active  Megan Díaz   

 

 TESTOSTERONE 0.2%/JASON CREAM  APPLY 0.25 ML TOPICALLY ONE DAILY AS DIRECTED  
     TESTOSTERONE 0.2%/JASON CREAM     Active  Megandrake Díaz   

 

 CLARITIN-D 12 HOUR XR12H-TAB  1 PO BID PRN       LORATADINE-
PSEUDOEPHEDRINE [id number]  [id number]  Active  Megandrake Díaz   

 

 MULTIVITAMINS  TABS  1 po daily    MULTIPLE VITAMIN  
[id number]  No Longer Active  Megan Jahaira Díaz   

 

 PLAQUENIL 200 MG TAB  1.5 po QD       HYDROXYCHLOROQUINE SULFATE  
[id number]  No Longer Active  Megan Jahaira Díaz   

 

 NABUMETONE 750 MG TABS  1 PO BID       NABUMETONE  [id number]  No 
Longer Active  Megan Jahaira Díaz   

 

 NIFEDIPINE 30 MG XR24H-TAB  1 PO daily    NIFEDIPINE  
[id number]  No Longer Active  Megan Jahaira Díaz   

 

 TUSSIONEX PENNKINETIC ER 8-10 MG/5ML LQCR  1 tsp PO BID prn cough    CHLORPHENIRAMINE-HYDROCODONE  [id number]  No Longer Active  Megan 
Jahaira Díaz   

 

 KEFLEX 500 MG CAP  1 PO TID for 7 days    2011/01/10  CEPHALEXIN  
[id number]  No Longer Active  Megan Jahaira Díaz   

 

 TUSSIONEX PENNKINETIC ER 8-10 MG/5ML LQCR  1 tsp PO TID prn cough    
2011/01/10  CHLORPHENIRAMINE-HYDROCODONE  [id number]  No Longer Active  Megan 
Jahaira Díaz   

 

 FELDENE 10 MG CAPS  2 PO daily at night        PIROXICAM  [id number]  No 
Longer Active  Megan Díaz   

 

 ALPRAZOLAM 0.25 MG TABS  1 po prn anxiety       ALPRAZOLAM  
[id number]  Active  Megan Jahaira Huiner   







Vital Signs







 Date  Name  Value  Unit  Range  Description

 

   blood pressure, diastolic - 8462-4  60  mm[Hg]     BP paniagua

 

   blood pressure, systolic - 8480-6  120  mm[Hg]     BP sys

 

   pulse rate E&M - 8867-4  60  /min     Heart rate

 

   respiratory rate E&M - 9279-1  14  /min     Resp rate

 

   weight E&M - 3141-9  120  [lb_av]     Weight Measured







Diagnostic Results







 Date  Name  Value  Unit  Range  Description

 

 Clinical Lists Update: CBC,CMP,TSH,FREE T4,HGA1C,FERRITIN - Chemistry 

 

   Estimated Glomerular Filtration Rate (calc)  105  mL/min/1.73m2   
   

 

   glucose, plasma fasting  79  mg/dL      

 

   albumin, serum  4.1  g/dL      

 

   alkaline phosphatase, serum  42  U/L      

 

   urea nitrogen, blood  17  mg/dL      

 

   calcium, serum  8.9  mg/dL      

 

   chloride, serum  101  mmol/L      

 

   carbon dioxide, venous blood  25  mmol/L      

 

   creatinine, serum  0.96  mg/dL      

 

   ferritin, serum  13  ng/mL      

 

   thyroxine, serum, free  1.3  ng/dL      

 

   hemoglobin A1C, blood, as % of total hemoglobin  5.3  %      

 

   thyroid stimulating hormone, serum  1.35  u[iU]/mL      

 

   potassium, serum  4.4  mmol/L      

 

   sodium, serum  135  mmol/L      

 

   bilirubin, serum, total  0.6  mg/dL      

 

   alanine aminotransferase (SGPT), serum  15  U/L      

 

   aspartate aminotransferase (SGOT), serum  19  U/L      

 

   protein, total, serum  6.4  g/dL      

 

 Clinical Lists Update: CBC,CMP,TSH,FREE T4,HGA1C,FERRITIN - Hematology 

 

   mean corpuscular volume, RBC  101.4  fL      

 

   hemoglobin, blood  13.9  g/dL      

 

   hematocrit, blood  41.0  %      

 

   red blood cell distribution width  12.4  %      

 

   leukocyte count, blood  4.9  10*3/mm3      

 

   erythrocyte (RBC) count  4.04  10*6/mm3      

 

   platelet count  298  10*3/mm3      

 

 Clinical Lists Update: ESR - Hematology 

 

   erythrocyte sedimentation rate  2  mm/h      

 

 Clinical Lists Update: [facility]  - Chemistry 

 

   cholesterol, serum  254  mg/dL      

 

   cholesterol/HDL ratio, serum, percent  2.4         

 

   LDL cholesterol, serum  132  mg/dL      

 

   triglyceride, serum, fasting  84  mg/dL      

 

   HDL cholesterol, serum  105  mg/dL      







Encounters







 Code  Encounter  Date  Provider  Facility

 

 CPT-53519  Ofc Vst, Est Level IV   17:15:07 CDT  Megan Díaz, DO, FACP

 

 CPT-71215  Ofc Vst, Est Level IV   17:13:37 CDT  Megan Díaz, DO, FACP

 

 CPT-67079  Ofc Vst, Est Level III   12:50:11 CST  Megan Díaz, DO, FACP

 

 CPT-26422  Ofc Vst, Est Level III   10:07:15 CST  Megan Díaz, DO, FACP

 

 CPT-18048  Ofc Vst, Est Level IV   11:04:36 CST  Megan Díaz, DO, FACP

 

 CPT-58087  Ofc Vst, Est Level IV   11:27:21 CST  Megan Díaz, DO, FACP

 

 CPT-17109  Ofc Vst, New Level IV   15:18:55 CST  Megan Díaz, DO, FACP

## 2017-12-23 NOTE — XMS REPORT
Megan Díaz DO, FACP Clinical Summary

 Created on: 2015



Marizol Dewitt

External Reference #: [id number]

: 1965

Sex: Female



Demographics







 Address  [address] [place]  [address]

 

 Home Phone  [email or web address]

 

 Preferred Language  Unknown

 

 Marital Status  D

 

 Restoration Affiliation  Unknown

 

 Race  White

 

 Ethnic Group  Not  or 





Author







 Author  User, [employer]  Megan Díaz DO, FACP

 

 Address  Unknown

 

 Phone  [phone]







Allergies, Adverse Reactions, Alerts







 Allergy Name  Reaction Description  Start Date  Severity  Status  Provider

 

 PENICILLIN  Rash    Critical  Active  Megan Díaz







Conditions or Problems







 Problem Name  Problem Code  Onset Date  Status  Entry Date  Provider  Comment  
Standard Description  Annotate

 

 UNSPECIFIED PERIPHERAL VASCULAR DISEASE  443.9    Resolved    Megan Díaz     Peripheral vascular disease, unspecified   

 

 WELL WOMAN  V70.0    Resolved    Megan Díaz     
Routine general medical examination at a health care facility   

 

 RAYNAUD'S SYNDROME  443.0    Active    Megan Díaz     Raynaud's syndrome   

 

 AUTOIMMUNE DISEASE NOT ELSEWHERE  CLASSIFIED  279.49    Resolved  
  Megan Díaz     Autoimmune disease, not elsewhere 
classified   

 

 POLYARTHRALGIA  719.49    Active    Megan Díaz  
   Pain in joint involving multiple sites   

 

 HYPERCHOLESTEROLEMIA  272.0    Active    Megan Díaz     Pure hypercholesterolemia   

 

 NEUROPATHY, IDIOPATHIC PERIPHERAL  356.9    Active    
Megan Díaz     Unspecified idiopathic peripheral neuropathy   

 

 VITAMIN B12 DEFICIENCY  266.2    Resolved    Megan Díaz     Other B-complex deficiencies   

 

 RHEUMATOID ARTHRITIS  714.0    Resolved    Megan Díaz     Rheumatoid arthritis   

 

 BRONCHITIS  490    Resolved    Megan Díaz     
Bronchitis, not specified as acute or chronic   

 

 CONNECTIVE TISSUE DISEASE, UNDIFFERENTIATED  710.9    Active    Megan Díaz     Unspecified diffuse connective tissue disease   







Medication List







 Medication  Instructions  Start Date  Stop Date  Generic Name  NDC  Status  
Provider  Patient Instruction

 

 ESTRIOL/ESTRADIOL (80/20%) 0.3MG/0.1ML CREAM  APPLY 0.5ML TOPICALLY EVERY DAY  
     ESTRIOL/ESTRADIOL (80/20%) 0.3MG/0.1ML CREAM     Active  Megandrake Díaz   

 

 PROGESTERONE CREAM 60MG/GM  APPLY ONE HALF ML TOPICALLY AT HS AS DIRECTED  2015     PROGESTERONE CREAM 60MG/GM     Active  Megan Díaz   

 

 TESTOSTERONE 0.2%/JASON CREAM  APPLY 0.25 ML TOPICALLY ONE DAILY AS DIRECTED  
     TESTOSTERONE 0.2%/JASON CREAM     Active  Megandrake Díaz   

 

 CLARITIN-D 12 HOUR XR12H-TAB  1 PO BID PRN       LORATADINE-
PSEUDOEPHEDRINE [id number]  [id number]  Active  Megandrake Díaz   

 

 MULTIVITAMINS  TABS  1 po daily    MULTIPLE VITAMIN  
[id number]  No Longer Active  Megan Jahaira Díaz   

 

 PLAQUENIL 200 MG TAB  1.5 po QD       HYDROXYCHLOROQUINE SULFATE  
[id number]  No Longer Active  Megan Jahaira Díaz   

 

 NABUMETONE 750 MG TABS  1 PO BID       NABUMETONE  [id number]  No 
Longer Active  Megan Jahaira Díaz   

 

 NIFEDIPINE 30 MG XR24H-TAB  1 PO daily    NIFEDIPINE  
[id number]  No Longer Active  Megan Jahaira Díaz   

 

 TUSSIONEX PENNKINETIC ER 8-10 MG/5ML LQCR  1 tsp PO BID prn cough    CHLORPHENIRAMINE-HYDROCODONE  [id number]  No Longer Active  Megan 
Jahaira Díaz   

 

 KEFLEX 500 MG CAP  1 PO TID for 7 days    2011/01/10  CEPHALEXIN  
[id number]  No Longer Active  Megan Jahaira Daíz   

 

 TUSSIONEX PENNKINETIC ER 8-10 MG/5ML LQCR  1 tsp PO TID prn cough    
2011/01/10  CHLORPHENIRAMINE-HYDROCODONE  [id number]  No Longer Active  Megan 
Jahaira Díaz   

 

 FELDENE 10 MG CAPS  2 PO daily at night        PIROXICAM  [id number]  No 
Longer Active  Megan Díaz   

 

 ALPRAZOLAM 0.25 MG TABS  1 po prn anxiety       ALPRAZOLAM  
[id number]  Active  Megan Jahaira Huiner   







Vital Signs







 Date  Name  Value  Unit  Range  Description

 

   blood pressure, diastolic - 8462-4  60  mm[Hg]     BP [place]

 

   blood pressure, systolic - 8480-6  120  mm[Hg]     BP sys

 

   pulse rate E&M - 8867-4  60  /min     Heart rate

 

   respiratory rate E&M - 9279-1  14  /min     Resp rate

 

   weight E&M - 3141-9  120  [lb_av]     Weight Measured







Diagnostic Results







 Date  Name  Value  Unit  Range  Description

 

 Clinical Lists Update: CBC,CMP,TSH,FREE T4,HGA1C,FERRITIN - Chemistry 

 

   Estimated Glomerular Filtration Rate (calc)  105  mL/min/1.73m2   
   

 

   glucose, plasma fasting  79  mg/dL      

 

   albumin, serum  4.1  g/dL      

 

   alkaline phosphatase, serum  42  U/L      

 

   urea nitrogen, blood  17  mg/dL      

 

   calcium, serum  8.9  mg/dL      

 

   chloride, serum  101  mmol/L      

 

   carbon dioxide, venous blood  25  mmol/L      

 

   creatinine, serum  0.96  mg/dL      

 

   ferritin, serum  13  ng/mL      

 

   thyroxine, serum, free  1.3  ng/dL      

 

   hemoglobin A1C, blood, as % of total hemoglobin  5.3  %      

 

   thyroid stimulating hormone, serum  1.35  u[iU]/mL      

 

   potassium, serum  4.4  mmol/L      

 

   sodium, serum  135  mmol/L      

 

   bilirubin, serum, total  0.6  mg/dL      

 

   alanine aminotransferase (SGPT), serum  15  U/L      

 

   aspartate aminotransferase (SGOT), serum  19  U/L      

 

   protein, total, serum  6.4  g/dL      

 

 Clinical Lists Update: CBC,CMP,TSH,FREE T4,HGA1C,FERRITIN - Hematology 

 

   mean corpuscular volume, RBC  101.4  fL      

 

   hemoglobin, blood  13.9  g/dL      

 

   hematocrit, blood  41.0  %      

 

   red blood cell distribution width  12.4  %      

 

   leukocyte count, blood  4.9  10*3/mm3      

 

   erythrocyte (RBC) count  4.04  10*6/mm3      

 

   platelet count  298  10*3/mm3      

 

 Clinical Lists Update: ESR - Hematology 

 

   erythrocyte sedimentation rate  2  mm/h      

 

 Clinical Lists Update: [facility]  - Chemistry 

 

   cholesterol, serum  254  mg/dL      

 

   cholesterol/HDL ratio, serum, percent  2.4         

 

   LDL cholesterol, serum  132  mg/dL      

 

   triglyceride, serum, fasting  84  mg/dL      

 

   HDL cholesterol, serum  105  mg/dL      







Encounters







 Code  Encounter  Date  Provider  Facility

 

 CPT-06481  Ofc Vst, Est Level IV   17:15:07 CDT  Megan Díaz, DO, FACP

 

 CPT-81983  Ofc Vst, Est Level IV   17:13:37 CDT  Megan Díaz, DO, FACP

 

 CPT-21360  Ofc Vst, Est Level III   12:50:11 CST  Megan Díaz, DO, FACP

 

 CPT-38166  Ofc Vst, Est Level III   10:07:15 CST  Megan Díaz, DO, FACP

 

 CPT-64661  Ofc Vst, Est Level IV   11:04:36 CST  Megan Díaz, DO, FACP

 

 CPT-44209  Ofc Vst, Est Level IV   11:27:21 CST  Megan Díaz, DO, FACP

 

 CPT-21594  Ofc Vst, New Level IV   15:18:55 CST  Megan Díaz, DO, FACP

## 2017-12-23 NOTE — XMS REPORT
Megan Díaz DO, FACP Clinical Summary

 Created on: 2015



Marizol Dewitt

External Reference #: [id number]

: 1965

Sex: Female



Demographics







 Address  [address] [place]  [address]

 

 Home Phone  [phone]

 

 Preferred Language  Unknown

 

 Marital Status  D

 

 Sabianism Affiliation  Unknown

 

 Race  White

 

 Ethnic Group  Not  or 





Author







 Author  User, [employer]  Megan Díaz DO, FACP

 

 Address  Unknown

 

 Phone  [phone]







Allergies, Adverse Reactions, Alerts







 Allergy Name  Reaction Description  Start Date  Severity  Status  Provider

 

 PENICILLIN  Rash    Critical  Active  Megan Díaz







Conditions or Problems







 Problem Name  Problem Code  Onset Date  Status  Entry Date  Provider  Comment  
Standard Description  Annotate

 

 UNSPECIFIED PERIPHERAL VASCULAR DISEASE  443.9    Resolved    Megan Díaz     Peripheral vascular disease, unspecified   

 

 WELL WOMAN  V70.0    Resolved    Megan Díaz     
Routine general medical examination at a health care facility   

 

 RAYNAUD'S SYNDROME  443.0    Active    Megan Díaz     Raynaud's syndrome   

 

 AUTOIMMUNE DISEASE NOT ELSEWHERE  CLASSIFIED  279.49    Resolved  
  Megan Díaz     Autoimmune disease, not elsewhere 
classified   

 

 POLYARTHRALGIA  719.49    Active    Megan Díaz  
   Pain in joint involving multiple sites   

 

 HYPERCHOLESTEROLEMIA  272.0    Active    Megan Díaz     Pure hypercholesterolemia   

 

 NEUROPATHY, IDIOPATHIC PERIPHERAL  356.9    Active    
Megan Díaz     Unspecified idiopathic peripheral neuropathy   

 

 VITAMIN B12 DEFICIENCY  266.2    Resolved    Megan Díaz     Other B-complex deficiencies   

 

 RHEUMATOID ARTHRITIS  714.0    Resolved    Megan Díaz     Rheumatoid arthritis   

 

 BRONCHITIS  490    Resolved    Megan Díaz     
Bronchitis, not specified as acute or chronic   

 

 CONNECTIVE TISSUE DISEASE, UNDIFFERENTIATED  710.9    Active    Megan Díaz     Unspecified diffuse connective tissue disease   

 

 MENOPAUSAL SYNDROME  627.0    Active    Megan Díaz     Premenopausal menorrhagia   







Medication List







 Medication  Instructions  Start Date  Stop Date  Generic Name  NDC  Status  
Provider  Patient Instruction

 

 EEMT HS 0.625-1.25 MG TABS  1 po daily       EST ESTROGENS-
METHYLTEST  [id number]  Active  Maeve Dahl   

 

 CLIMARA PRO 0.045-0.015 MG/DAY PTWK  1 patch weekly       ESTRADIOL-
LEVONORGESTREL  [id number]  Active  Megan Díaz   

 

 ESTRIOL/ESTRADIOL (80/20%) 0.3MG/0.1ML CREAM  APPLY 0.5ML TOPICALLY EVERY DAY  
  ESTRIOL/ESTRADIOL (80/20%) 0.3MG/0.1ML CREAM     No 
Longer Active  Megan Díaz   

 

 PROGESTERONE CREAM 60MG/GM  APPLY ONE HALF ML TOPICALLY AT HS AS DIRECTED  2015  PROGESTERONE CREAM 60MG/GM     No Longer Active  Megan Díaz   

 

 TESTOSTERONE 0.2%/JASON CREAM  APPLY 0.25 ML TOPICALLY ONE DAILY AS DIRECTED  
     TESTOSTERONE 0.2%/JASON CREAM     No Longer Active  Elysia Rey 
  

 

 CLARITIN-D 12 HOUR XR12H-TAB  1 PO BID PRN       LORATADINE-
PSEUDOEPHEDRINE [id number]  [id number]  Active  Megan Díaz   

 

 MULTIVITAMINS  TABS  1 po daily    MULTIPLE VITAMIN  
[id number]  No Longer Active  Megan Díaz   

 

 PLAQUENIL 200 MG TAB  1.5 po QD       HYDROXYCHLOROQUINE SULFATE  
[id number]  No Longer Active  Megandrake Díaz   

 

 NABUMETONE 750 MG TABS  1 PO BID       NABUMETONE  [id number]  No 
Longer Active  Megan Díaz   

 

 NIFEDIPINE 30 MG XR24H-TAB  1 PO daily    NIFEDIPINE  
[id number]  No Longer Active  Megan Díaz   

 

 TUSSIONEX PENNKINETIC ER 8-10 MG/5ML LQCR  1 tsp PO BID prn cough    
  CHLORPHENIRAMINE-HYDROCODONE  [id number]  No Longer Active  Megandrake Díaz   

 

 KEFLEX 500 MG CAP  1 PO TID for 7 days    2011/01/10  CEPHALEXIN  
[id number]  No Longer Active  Megan Jahaira Díaz   

 

 TUSSIONEX PENNKINETIC ER 8-10 MG/5ML LQCR  1 tsp PO TID prn cough    
2011/01/10  CHLORPHENIRAMINE-HYDROCODONE  [id number]  No Longer Active  Megan 
Jahaira Díaz   

 

 FELDENE 10 MG CAPS  2 PO daily at night        PIROXICAM  [id number]  No 
Longer Active  Megan Jahaira Díaz   

 

 ALPRAZOLAM 0.25 MG TABS  1 po prn anxiety       ALPRAZOLAM  
[id number]  Active  Megan Jahaira Díaz   







Vital Signs







 Date  Name  Value  Unit  Range  Description

 

   blood pressure, diastolic - 8462-4  72  mm[Hg]     BP [place]

 

   blood pressure, systolic - 8480-6  116  mm[Hg]     BP sys

 

   pulse rate E&M - 8867-4  72  /min     Heart rate

 

   respiratory rate E&M - 9279-1  14  /min     Resp rate

 

   temperature E&M  98.6  [degF]     Body temperature

 

   weight E&M - 3141-9  128  [lb_av]     Weight Measured

 

   blood pressure, diastolic - 8462-4  60  mm[Hg]     BP [place]

 

   blood pressure, systolic - 8480-6  120  mm[Hg]     BP sys

 

   pulse rate E&M - 8867-4  60  /min     Heart rate

 

   respiratory rate E&M - 9279-1  14  /min     Resp rate

 

   weight E&M - 3141-9  120  [lb_av]     Weight Measured







Diagnostic Results







 Date  Name  Value  Unit  Range  Description

 

 Clinical Lists Update: CBC,CMP,ESR,Ferritin,FLP - Chemistry 

 

   urea nitrogen, blood  13  mg/dL      

 

   alanine aminotransferase (SGPT), serum  21  U/L      

 

   protein, total, serum  6.7  g/dL      

 

   glucose, plasma fasting  91  mg/dL      

 

   triglyceride, serum, fasting  204  mg/dL      

 

   alkaline phosphatase, serum  43  U/L      

 

   potassium, serum  3.9  mmol/L      

 

   sodium, serum  137  mmol/L      

 

   calcium, serum  9.4  mg/dL      

 

   aspartate aminotransferase (SGOT), serum  24  U/L      

 

   chloride, serum  100  mmol/L      

 

   Estimated Glomerular Filtration Rate (calc)  73  mL/min/1.73m2    
  

 

   cholesterol, serum  258  mg/dL      

 

   albumin, serum  4.5  g/dL      

 

   carbon dioxide, venous blood  27  mmol/L      

 

   ferritin, serum  15  ng/mL      

 

   creatinine, serum  0.92  mg/dL      

 

   bilirubin, serum, total  0.6  mg/dL      

 

 Clinical Lists Update: CBC,CMP,ESR,Ferritin,FLP - Hematology 

 

   hematocrit, blood  42.1  %      

 

   erythrocyte (RBC) count  4.25  10*6/mm3      

 

   platelet count  304  10*3/mm3      

 

   erythrocyte sedimentation rate  9  mm/h      

 

   leukocyte count, blood  5.5  10*3/mm3      

 

   hemoglobin, blood  14.2  g/dL      

 

   red blood cell distribution width  13.0  %      

 

   mean corpuscular volume, RBC  99.0  fL      

 

 Clinical Lists Update: CBC,CMP,TSH,FREE T4,HGA1C,FERRITIN - Chemistry 

 

   albumin, serum  4.1  g/dL      

 

   alkaline phosphatase, serum  42  U/L      

 

   urea nitrogen, blood  17  mg/dL      

 

   calcium, serum  8.9  mg/dL      

 

   chloride, serum  101  mmol/L      

 

   creatinine, serum  0.96  mg/dL      

 

   ferritin, serum  13  ng/mL      

 

   thyroxine, serum, free  1.3  ng/dL      

 

   hemoglobin A1C, blood, as % of total hemoglobin  5.3  %      

 

   thyroid stimulating hormone, serum  1.35  u[iU]/mL      

 

   potassium, serum  4.4  mmol/L      

 

   protein, total, serum  6.4  g/dL      

 

   aspartate aminotransferase (SGOT), serum  19  U/L      

 

   alanine aminotransferase (SGPT), serum  15  U/L      

 

   bilirubin, serum, total  0.6  mg/dL      

 

   sodium, serum  135  mmol/L      

 

   glucose, plasma fasting  79  mg/dL      

 

   Estimated Glomerular Filtration Rate (calc)  105  mL/min/1.73m2   
   

 

   carbon dioxide, venous blood  25  mmol/L      

 

 Clinical Lists Update: CBC,CMP,TSH,FREE T4,HGA1C,FERRITIN - Hematology 

 

   erythrocyte (RBC) count  4.04  10*6/mm3      

 

   hematocrit, blood  41.0  %      

 

   red blood cell distribution width  12.4  %      

 

   leukocyte count, blood  4.9  10*3/mm3      

 

   platelet count  298  10*3/mm3      

 

   mean corpuscular volume, RBC  101.4  fL      

 

   hemoglobin, blood  13.9  g/dL      

 

 Clinical Lists Update: ESR - Hematology 

 

   erythrocyte sedimentation rate  2  mm/h      

 

 Clinical Lists Update: [facility]  - Chemistry 

 

   cholesterol/HDL ratio, serum, percent  2.4         

 

   triglyceride, serum, fasting  84  mg/dL      

 

   HDL cholesterol, serum  105  mg/dL      

 

   cholesterol, serum  254  mg/dL      

 

   LDL cholesterol, serum  132  mg/dL      







Encounters







 Code  Encounter  Date  Provider  Facility

 

 CPT-08018  Ofc Vst, Est Level IV   17:47:15 CDT  Megan Díaz DO, FACP

 

 CPT-41772  Ofc Vst, Est Level IV   17:15:07 CDT  Megan Díaz DO, FACP

 

 CPT-83172  Ofc Vst, Est Level IV   17:13:37 CDT  Megan Díaz DO, FACP

 

 CPT-36228  Ofc Vst, Est Level III   12:50:11 CST  Megan Díaz DO, FACP

 

 CPT-10904  Ofc Vst, Est Level III   10:07:15 CST  Megan Díaz DO, FACP

 

 CPT-70538  Ofc Vst, Est Level IV   11:04:36 CST  Megan Díaz DO, FACP

 

 CPT-20114  Ofc Vst, Est Level IV   11:27:21 CST  Megan Díaz DO, FACP

 

 CPT-84997  Ofc Vst, New Level IV   15:18:55 CST  Megan Díaz DO, FACP

## 2017-12-23 NOTE — XMS REPORT
Megan Díaz DO, FACP Clinical Summary

 Created on: 2015



Marizol Dewitt

External Reference #: [id number]

: 1965

Sex: Female



Demographics







 Address  [address] [place]  [address]

 

 Home Phone  [email or web address]

 

 Preferred Language  Unknown

 

 Marital Status  D

 

 Mosque Affiliation  Unknown

 

 Race  White

 

 Ethnic Group  Not  or 





Author







 Author  User, [employer]  Megan Díaz DO, FACP

 

 Address  Unknown

 

 Phone  [phone]







Allergies, Adverse Reactions, Alerts







 Allergy Name  Reaction Description  Start Date  Severity  Status  Provider

 

 PENICILLIN  Rash    Critical  Active  Megan Díaz







Conditions or Problems







 Problem Name  Problem Code  Onset Date  Status  Entry Date  Provider  Comment  
Standard Description  Annotate

 

 UNSPECIFIED PERIPHERAL VASCULAR DISEASE  443.9    Resolved    Megan Díaz     Peripheral vascular disease, unspecified   

 

 WELL WOMAN  V70.0    Resolved    Megan Díaz     
Routine general medical examination at a health care facility   

 

 RAYNAUD'S SYNDROME  443.0    Active    Megan Díaz     Raynaud's syndrome   

 

 AUTOIMMUNE DISEASE NOT ELSEWHERE  CLASSIFIED  279.49    Resolved  
  Megan Díaz     Autoimmune disease, not elsewhere 
classified   

 

 POLYARTHRALGIA  719.49    Active    Megan Díaz  
   Pain in joint involving multiple sites   

 

 HYPERCHOLESTEROLEMIA  272.0    Active    Megan Díaz     Pure hypercholesterolemia   

 

 NEUROPATHY, IDIOPATHIC PERIPHERAL  356.9    Active    
Megan Díaz     Unspecified idiopathic peripheral neuropathy   

 

 VITAMIN B12 DEFICIENCY  266.2    Resolved    Megan Díaz     Other B-complex deficiencies   

 

 RHEUMATOID ARTHRITIS  714.0    Resolved    Megan Díaz     Rheumatoid arthritis   

 

 BRONCHITIS  490    Resolved    Megan Díaz     
Bronchitis, not specified as acute or chronic   

 

 CONNECTIVE TISSUE DISEASE, UNDIFFERENTIATED  710.9    Active    Megan Díaz     Unspecified diffuse connective tissue disease   







Medication List







 Medication  Instructions  Start Date  Stop Date  Generic Name  NDC  Status  
Provider  Patient Instruction

 

 ESTRIOL/ESTRADIOL (80/20%) 0.3MG/0.1ML CREAM  APPLY 0.5ML TOPICALLY EVERY DAY  
     ESTRIOL/ESTRADIOL (80/20%) 0.3MG/0.1ML CREAM     Active  Megandrake Díaz   

 

 PROGESTERONE CREAM 60MG/GM  APPLY ONE HALF ML TOPICALLY AT HS AS DIRECTED  2015     PROGESTERONE CREAM 60MG/GM     Active  Megan Díaz   

 

 TESTOSTERONE 0.2%/JASON CREAM  APPLY 0.25 ML TOPICALLY ONE DAILY AS DIRECTED  
     TESTOSTERONE 0.2%/JASON CREAM     Active  Megandrake Díaz   

 

 CLARITIN-D 12 HOUR XR12H-TAB  1 PO BID PRN       LORATADINE-
PSEUDOEPHEDRINE [id number]  [id number]  Active  Megandrake Díaz   

 

 MULTIVITAMINS  TABS  1 po daily    MULTIPLE VITAMIN  
[id number]  No Longer Active  Megan Jahaira Díaz   

 

 PLAQUENIL 200 MG TAB  1.5 po QD       HYDROXYCHLOROQUINE SULFATE  
[id number]  No Longer Active  Megan Jahaira Díaz   

 

 NABUMETONE 750 MG TABS  1 PO BID       NABUMETONE  [id number]  No 
Longer Active  Megan Jahaira Díaz   

 

 NIFEDIPINE 30 MG XR24H-TAB  1 PO daily    NIFEDIPINE  
[id number]  No Longer Active  Megan Jahaira Díaz   

 

 TUSSIONEX PENNKINETIC ER 8-10 MG/5ML LQCR  1 tsp PO BID prn cough    CHLORPHENIRAMINE-HYDROCODONE  [id number]  No Longer Active  Megan 
Jahaira Díaz   

 

 KEFLEX 500 MG CAP  1 PO TID for 7 days    2011/01/10  CEPHALEXIN  
[id number]  No Longer Active  Megan Jahaira Díaz   

 

 TUSSIONEX PENNKINETIC ER 8-10 MG/5ML LQCR  1 tsp PO TID prn cough    
2011/01/10  CHLORPHENIRAMINE-HYDROCODONE  [id number]  No Longer Active  Megan 
Jahaira Díaz   

 

 FELDENE 10 MG CAPS  2 PO daily at night        PIROXICAM  [id number]  No 
Longer Active  Megan Díaz   

 

 ALPRAZOLAM 0.25 MG TABS  1 po prn anxiety       ALPRAZOLAM  
[id number]  Active  Megan Jahaira Huiner   







Vital Signs







 Date  Name  Value  Unit  Range  Description

 

   blood pressure, diastolic - 8462-4  60  mm[Hg]     BP pan[place]

 

   blood pressure, systolic - 8480-6  120  mm[Hg]     BP sys

 

   pulse rate E&M - 8867-4  60  /min     Heart rate

 

   respiratory rate E&M - 9279-1  14  /min     Resp rate

 

   weight E&M - 3141-9  120  [lb_av]     Weight Measured







Diagnostic Results







 Date  Name  Value  Unit  Range  Description

 

 Clinical Lists Update: CBC,CMP,TSH,FREE T4,HGA1C,FERRITIN - Chemistry 

 

   Estimated Glomerular Filtration Rate (calc)  105  mL/min/1.73m2   
   

 

   glucose, plasma fasting  79  mg/dL      

 

   albumin, serum  4.1  g/dL      

 

   alkaline phosphatase, serum  42  U/L      

 

   urea nitrogen, blood  17  mg/dL      

 

   calcium, serum  8.9  mg/dL      

 

   chloride, serum  101  mmol/L      

 

   carbon dioxide, venous blood  25  mmol/L      

 

   creatinine, serum  0.96  mg/dL      

 

   ferritin, serum  13  ng/mL      

 

   thyroxine, serum, free  1.3  ng/dL      

 

   hemoglobin A1C, blood, as % of total hemoglobin  5.3  %      

 

   thyroid stimulating hormone, serum  1.35  u[iU]/mL      

 

   potassium, serum  4.4  mmol/L      

 

   sodium, serum  135  mmol/L      

 

   bilirubin, serum, total  0.6  mg/dL      

 

   alanine aminotransferase (SGPT), serum  15  U/L      

 

   aspartate aminotransferase (SGOT), serum  19  U/L      

 

   protein, total, serum  6.4  g/dL      

 

 Clinical Lists Update: CBC,CMP,TSH,FREE T4,HGA1C,FERRITIN - Hematology 

 

   mean corpuscular volume, RBC  101.4  fL      

 

   hemoglobin, blood  13.9  g/dL      

 

   hematocrit, blood  41.0  %      

 

   red blood cell distribution width  12.4  %      

 

   leukocyte count, blood  4.9  10*3/mm3      

 

   erythrocyte (RBC) count  4.04  10*6/mm3      

 

   platelet count  298  10*3/mm3      

 

 Clinical Lists Update: ESR - Hematology 

 

   erythrocyte sedimentation rate  2  mm/h      

 

 Clinical Lists Update: [facility]  - Chemistry 

 

   cholesterol, serum  254  mg/dL      

 

   cholesterol/HDL ratio, serum, percent  2.4         

 

   LDL cholesterol, serum  132  mg/dL      

 

   triglyceride, serum, fasting  84  mg/dL      

 

   HDL cholesterol, serum  105  mg/dL      







Encounters







 Code  Encounter  Date  Provider  Facility

 

 CPT-37127  Ofc Vst, Est Level IV   17:15:07 CDT  Megan Díaz, DO, FACP

 

 CPT-34213  Ofc Vst, Est Level IV   17:13:37 CDT  Megan Díaz, DO, FACP

 

 CPT-39241  Ofc Vst, Est Level III   12:50:11 CST  Megan Díaz, DO, FACP

 

 CPT-60902  Ofc Vst, Est Level III   10:07:15 CST  Megan Díaz, DO, FACP

 

 CPT-05515  Ofc Vst, Est Level IV   11:04:36 CST  Megan Díaz, DO, FACP

 

 CPT-42708  Ofc Vst, Est Level IV   11:27:21 CST  Megan Díaz, DO, FACP

 

 CPT-39640  Ofc Vst, New Level IV   15:18:55 CST  Megan Díaz, DO, FACP

## 2017-12-23 NOTE — XMS REPORT
Megan Díaz DO, FACP Clinical Summary

 Created on: 2015



Marizol Dewitt

External Reference #: [id number]

: 1965

Sex: Female



Demographics







 Address  [address] [place]  [address]

 

 Home Phone  [phone]

 

 Preferred Language  Unknown

 

 Marital Status  D

 

 Scientologist Affiliation  Unknown

 

 Race  White

 

 Ethnic Group  Not  or 





Author







 Author  User, Ellie  Megan Díaz DO, JAVIERP

 

 Address  Unknown

 

 Phone  [phone]







Allergies, Adverse Reactions, Alerts







 Allergy Name  Reaction Description  Start Date  Severity  Status  Provider

 

 PENICILLIN  Rash    Critical  Active  Megan Díaz







Conditions or Problems







 Problem Name  Problem Code  Onset Date  Status  Entry Date  Provider  Comment  
Standard Description  Annotate

 

 UNSPECIFIED PERIPHERAL VASCULAR DISEASE  443.9    Resolved    Megan Díaz     Peripheral vascular disease, unspecified   

 

 WELL WOMAN  V70.0    Resolved    Megan Díaz     
Routine general medical examination at a health care facility   

 

 RAYNAUD'S SYNDROME  443.0    Active    Megan Díaz     Raynaud's syndrome   

 

 AUTOIMMUNE DISEASE NOT ELSEWHERE  CLASSIFIED  279.49    Resolved  
  Megan Díaz     Autoimmune disease, not elsewhere 
classified   

 

 POLYARTHRALGIA  719.49    Active    Megan Díaz  
   Pain in joint involving multiple sites   

 

 HYPERCHOLESTEROLEMIA  272.0    Active    Megan Díaz     Pure hypercholesterolemia   

 

 NEUROPATHY, IDIOPATHIC PERIPHERAL  356.9    Active    
Megan Díaz     Unspecified idiopathic peripheral neuropathy   

 

 VITAMIN B12 DEFICIENCY  266.2    Resolved    Megan Díaz     Other B-complex deficiencies   

 

 RHEUMATOID ARTHRITIS  714.0    Resolved    Megan Díaz     Rheumatoid arthritis   

 

 BRONCHITIS  490    Resolved    Megan Díza     
Bronchitis, not specified as acute or chronic   

 

 CONNECTIVE TISSUE DISEASE, UNDIFFERENTIATED  710.9    Active    Megan Díaz     Unspecified diffuse connective tissue disease   







Medication List







 Medication  Instructions  Start Date  Stop Date  Generic Name  NDC  Status  
Provider  Patient Instruction

 

 ESTRIOL/ESTRADIOL (80/20%) 0.3MG/0.1ML CREAM  APPLY 0.5ML TOPICALLY EVERY DAY  
     ESTRIOL/ESTRADIOL (80/20%) 0.3MG/0.1ML CREAM     Active  Megan Díaz   

 

 PROGESTERONE CREAM 60MG/GM  APPLY ONE HALF ML TOPICALLY AT HS AS DIRECTED  2015     PROGESTERONE CREAM 60MG/GM     Active  Megan Díaz   

 

 TESTOSTERONE 0.2%/JASON CREAM  APPLY 0.25 ML TOPICALLY ONE DAILY AS DIRECTED  
     TESTOSTERONE 0.2%/JASON CREAM     Active  Megan Díaz   

 

 CLARITIN-D 12 HOUR XR12H-TAB  1 PO BID PRN       LORATADINE-
PSEUDOEPHEDRINE [id number]  [id number]  Active  Megandrake Díaz   

 

 MULTIVITAMINS  TABS  1 po daily    MULTIPLE VITAMIN  
[id number]  No Longer Active  Megan Jahaira Díaz   

 

 PLAQUENIL 200 MG TAB  1.5 po QD       HYDROXYCHLOROQUINE SULFATE  
[id number]  No Longer Active  Megandrake Díaz   

 

 NABUMETONE 750 MG TABS  1 PO BID       NABUMETONE  [id number]  No 
Longer Active  Megandrake Díaz   

 

 NIFEDIPINE 30 MG XR24H-TAB  1 PO daily    NIFEDIPINE  
[id number]  No Longer Active  Megan Jahaira Díaz   

 

 TUSSIONEX PENNKINETIC ER 8-10 MG/5ML LQCR  1 tsp PO BID prn cough    CHLORPHENIRAMINE-HYDROCODONE  [id number]  No Longer Active  Megan 
Jahaira Díaz   

 

 KEFLEX 500 MG CAP  1 PO TID for 7 days    2011/01/10  CEPHALEXIN  
[id number]  No Longer Active  Megan Jahaira Díaz   

 

 TUSSIONEX PENNKINETIC ER 8-10 MG/5ML LQCR  1 tsp PO TID prn cough    
2011/01/10  CHLORPHENIRAMINE-HYDROCODONE  [id number]  No Longer Active  Megan 
Jahaira Díaz   

 

 FELDENE 10 MG CAPS  2 PO daily at night        PIROXICAM  [id number]  No 
Longer Active  Megan Díaz   

 

 ALPRAZOLAM 0.25 MG TABS  1 po prn anxiety       ALPRAZOLAM  
[id number]  Active  Megan Jahaira Díaz   







Vital Signs







 Date  Name  Value  Unit  Range  Description

 

   blood pressure, diastolic - 8462-4  60  mm[Hg]     BP pan[place]

 

   blood pressure, systolic - 8480-6  120  mm[Hg]     BP sys

 

   pulse rate E&M - 8867-4  60  /min     Heart rate

 

   respiratory rate E&M - 9279-1  14  /min     Resp rate

 

   weight E&M - 3141-9  120  [lb_av]     Weight Measured







Diagnostic Results







 Date  Name  Value  Unit  Range  Description

 

 Clinical Lists Update: CBC,CMP,TSH,FREE T4,HGA1C,FERRITIN - Chemistry 

 

   Estimated Glomerular Filtration Rate (calc)  105  mL/min/1.73m2   
   

 

   glucose, plasma fasting  79  mg/dL      

 

   albumin, serum  4.1  g/dL      

 

   alkaline phosphatase, serum  42  U/L      

 

   urea nitrogen, blood  17  mg/dL      

 

   calcium, serum  8.9  mg/dL      

 

   chloride, serum  101  mmol/L      

 

   carbon dioxide, venous blood  25  mmol/L      

 

   creatinine, serum  0.96  mg/dL      

 

   ferritin, serum  13  ng/mL      

 

   thyroxine, serum, free  1.3  ng/dL      

 

   hemoglobin A1C, blood, as % of total hemoglobin  5.3  %      

 

   thyroid stimulating hormone, serum  1.35  u[iU]/mL      

 

   potassium, serum  4.4  mmol/L      

 

   sodium, serum  135  mmol/L      

 

   bilirubin, serum, total  0.6  mg/dL      

 

   alanine aminotransferase (SGPT), serum  15  U/L      

 

   aspartate aminotransferase (SGOT), serum  19  U/L      

 

   protein, total, serum  6.4  g/dL      

 

 Clinical Lists Update: CBC,CMP,TSH,FREE T4,HGA1C,FERRITIN - Hematology 

 

   mean corpuscular volume, RBC  101.4  fL      

 

   hemoglobin, blood  13.9  g/dL      

 

   hematocrit, blood  41.0  %      

 

   red blood cell distribution width  12.4  %      

 

   leukocyte count, blood  4.9  10*3/mm3      

 

   erythrocyte (RBC) count  4.04  10*6/mm3      

 

   platelet count  298  10*3/mm3      

 

 Clinical Lists Update: ESR - Hematology 

 

   erythrocyte sedimentation rate  2  mm/h      

 

 Clinical Lists Update: [facility]  - Chemistry 

 

   cholesterol, serum  254  mg/dL      

 

   cholesterol/HDL ratio, serum, percent  2.4         

 

   LDL cholesterol, serum  132  mg/dL      

 

   triglyceride, serum, fasting  84  mg/dL      

 

   HDL cholesterol, serum  105  mg/dL      







Encounters







 Code  Encounter  Date  Provider  Facility

 

 CPT-78821  Ofc Vst, Est Level IV   17:15:07 CDT  Megan Díaz, DO, FACP

 

 CPT-97376  Ofc Vst, Est Level IV   17:13:37 CDT  Megan Díaz, DO, FACP

 

 CPT-97877  Ofc Vst, Est Level III   12:50:11 CST  Megan Díaz DO, FACP

 

 CPT-57080  Ofc Vst, Est Level III   10:07:15 CST  Megan Díaz, DO, FACP

 

 CPT-41476  Ofc Vst, Est Level IV   11:04:36 CST  Megan Díaz, DO, FACP

 

 CPT-29182  Ofc Vst, Est Level IV   11:27:21 CST  Megan Díaz DO, FACP

 

 CPT-26236  Ofc Vst, New Level IV   15:18:55 CST  Megan Díaz, DO, FACP

## 2017-12-23 NOTE — XMS REPORT
Megan Díaz DO, FACP Clinical Summary

 Created on: 2015



Marizol Dewitt

External Reference #: [id number]

: 1965

Sex: Female



Demographics







 Address  [address] [place]  [address]

 

 Home Phone  [phone]

 

 Preferred Language  Unknown

 

 Marital Status  D

 

 Gnosticism Affiliation  Unknown

 

 Race  White

 

 Ethnic Group  Not  or 





Author







 Author  User, [employer]  Megan Díaz DO, JAVIERP

 

 Address  Unknown

 

 Phone  [phone]







Allergies, Adverse Reactions, Alerts







 Allergy Name  Reaction Description  Start Date  Severity  Status  Provider

 

 PENICILLIN  Rash    Critical  Active  Megan Díaz







Conditions or Problems







 Problem Name  Problem Code  Onset Date  Status  Entry Date  Provider  Comment  
Standard Description  Annotate

 

 UNSPECIFIED PERIPHERAL VASCULAR DISEASE  443.9    Resolved    Megan Díaz     Peripheral vascular disease, unspecified   

 

 WELL WOMAN  V70.0    Resolved    Megan Díaz     
Routine general medical examination at a health care facility   

 

 RAYNAUD'S SYNDROME  443.0    Active    Megan Díaz     Raynaud's syndrome   

 

 AUTOIMMUNE DISEASE NOT ELSEWHERE  CLASSIFIED  279.49    Resolved  
  Megan Díaz     Autoimmune disease, not elsewhere 
classified   

 

 POLYARTHRALGIA  719.49    Active    Megan Díaz  
   Pain in joint involving multiple sites   

 

 HYPERCHOLESTEROLEMIA  272.0    Active    Megan Díaz     Pure hypercholesterolemia   

 

 NEUROPATHY, IDIOPATHIC PERIPHERAL  356.9    Active    
Megan Díaz     Unspecified idiopathic peripheral neuropathy   

 

 VITAMIN B12 DEFICIENCY  266.2    Resolved    Megan Díaz     Other B-complex deficiencies   

 

 RHEUMATOID ARTHRITIS  714.0    Resolved    Megan Díaz     Rheumatoid arthritis   

 

 BRONCHITIS  490    Resolved    Megan Díaz     
Bronchitis, not specified as acute or chronic   

 

 CONNECTIVE TISSUE DISEASE, UNDIFFERENTIATED  710.9    Active    Megan Díaz     Unspecified diffuse connective tissue disease   







Medication List







 Medication  Instructions  Start Date  Stop Date  Generic Name  NDC  Status  
Provider  Patient Instruction

 

 ESTRIOL/ESTRADIOL (80/20%) 0.3MG/0.1ML CREAM  APPLY 0.5ML TOPICALLY EVERY DAY  
     ESTRIOL/ESTRADIOL (80/20%) 0.3MG/0.1ML CREAM     Active  Megan Díaz   

 

 PROGESTERONE CREAM 60MG/GM  APPLY ONE HALF ML TOPICALLY AT HS AS DIRECTED  2015     PROGESTERONE CREAM 60MG/GM     Active  Megan Díaz   

 

 TESTOSTERONE 0.2%/JASON CREAM  APPLY 0.25 ML TOPICALLY ONE DAILY AS DIRECTED  
     TESTOSTERONE 0.2%/JASON CREAM     Active  Megan Díaz   

 

 CLARITIN-D 12 HOUR XR12H-TAB  1 PO BID PRN       LORATADINE-
PSEUDOEPHEDRINE [id number]  [id number]  Active  Megandrake Díaz   

 

 MULTIVITAMINS  TABS  1 po daily    MULTIPLE VITAMIN  
[id number]  No Longer Active  Megan Jahaira Díaz   

 

 PLAQUENIL 200 MG TAB  1.5 po QD       HYDROXYCHLOROQUINE SULFATE  
[id number]  No Longer Active  Megandrake Díaz   

 

 NABUMETONE 750 MG TABS  1 PO BID       NABUMETONE  [id number]  No 
Longer Active  Megandrake Díaz   

 

 NIFEDIPINE 30 MG XR24H-TAB  1 PO daily    NIFEDIPINE  
[id number]  No Longer Active  Megan Jahaira Díaz   

 

 TUSSIONEX PENNKINETIC ER 8-10 MG/5ML LQCR  1 tsp PO BID prn cough    CHLORPHENIRAMINE-HYDROCODONE  [id number]  No Longer Active  Megan 
Jahaira Díaz   

 

 KEFLEX 500 MG CAP  1 PO TID for 7 days    2011/01/10  CEPHALEXIN  
[id number]  No Longer Active  Megan Jahaira Díaz   

 

 TUSSIONEX PENNKINETIC ER 8-10 MG/5ML LQCR  1 tsp PO TID prn cough    
2011/01/10  CHLORPHENIRAMINE-HYDROCODONE  [id number]  No Longer Active  Megan 
Jahaira Díaz   

 

 FELDENE 10 MG CAPS  2 PO daily at night        PIROXICAM  [id number]  No 
Longer Active  Megan Díaz   

 

 ALPRAZOLAM 0.25 MG TABS  1 po prn anxiety       ALPRAZOLAM  
[id number]  Active  Megan Jahaira Díaz   







Vital Signs







 Date  Name  Value  Unit  Range  Description

 

   blood pressure, diastolic - 8462-4  60  mm[Hg]     BP [place]

 

   blood pressure, systolic - 8480-6  120  mm[Hg]     BP sys

 

   pulse rate E&M - 8867-4  60  /min     Heart rate

 

   respiratory rate E&M - 9279-1  14  /min     Resp rate

 

   weight E&M - 3141-9  120  [lb_av]     Weight Measured







Diagnostic Results







 Date  Name  Value  Unit  Range  Description

 

 Clinical Lists Update: CBC,CMP,TSH,FREE T4,HGA1C,FERRITIN - Chemistry 

 

   Estimated Glomerular Filtration Rate (calc)  105  mL/min/1.73m2   
   

 

   glucose, plasma fasting  79  mg/dL      

 

   albumin, serum  4.1  g/dL      

 

   alkaline phosphatase, serum  42  U/L      

 

   urea nitrogen, blood  17  mg/dL      

 

   calcium, serum  8.9  mg/dL      

 

   chloride, serum  101  mmol/L      

 

   carbon dioxide, venous blood  25  mmol/L      

 

   creatinine, serum  0.96  mg/dL      

 

   ferritin, serum  13  ng/mL      

 

   thyroxine, serum, free  1.3  ng/dL      

 

   hemoglobin A1C, blood, as % of total hemoglobin  5.3  %      

 

   thyroid stimulating hormone, serum  1.35  u[iU]/mL      

 

   potassium, serum  4.4  mmol/L      

 

   sodium, serum  135  mmol/L      

 

   bilirubin, serum, total  0.6  mg/dL      

 

   alanine aminotransferase (SGPT), serum  15  U/L      

 

   aspartate aminotransferase (SGOT), serum  19  U/L      

 

   protein, total, serum  6.4  g/dL      

 

 Clinical Lists Update: CBC,CMP,TSH,FREE T4,HGA1C,FERRITIN - Hematology 

 

   mean corpuscular volume, RBC  101.4  fL      

 

   hemoglobin, blood  13.9  g/dL      

 

   hematocrit, blood  41.0  %      

 

   red blood cell distribution width  12.4  %      

 

   leukocyte count, blood  4.9  10*3/mm3      

 

   erythrocyte (RBC) count  4.04  10*6/mm3      

 

   platelet count  298  10*3/mm3      

 

 Clinical Lists Update: ESR - Hematology 

 

   erythrocyte sedimentation rate  2  mm/h      

 

 Clinical Lists Update: [facility]  - Chemistry 

 

   cholesterol, serum  254  mg/dL      

 

   cholesterol/HDL ratio, serum, percent  2.4         

 

   LDL cholesterol, serum  132  mg/dL      

 

   triglyceride, serum, fasting  84  mg/dL      

 

   HDL cholesterol, serum  105  mg/dL      







Encounters







 Code  Encounter  Date  Provider  Facility

 

 CPT-64828  Ofc Vst, Est Level IV   17:15:07 CDT  Megan Díaz, DO, FACP

 

 CPT-33935  Ofc Vst, Est Level IV   17:13:37 CDT  Megan Díaz, DO, FACP

 

 CPT-62130  Ofc Vst, Est Level III   12:50:11 CST  Megan Díaz DO, FACP

 

 CPT-36340  Ofc Vst, Est Level III   10:07:15 CST  Megan Díaz, DO, FACP

 

 CPT-73543  Ofc Vst, Est Level IV   11:04:36 CST  Megan Díaz, DO, FACP

 

 CPT-73854  Ofc Vst, Est Level IV   11:27:21 CST  Megan Díaz DO, FACP

 

 CPT-53762  Ofc Vst, New Level IV   15:18:55 CST  Megan Díaz, DO, FACP

## 2017-12-23 NOTE — DIAGNOSTIC IMAGING REPORT
CLINICAL INDICATION: Followup DVT of right subclavian vein.

Patient has chest pain with coughing.



EXAM: CT angiogram of the chest performed with 125  cc Omnipaque

350 IV contrast. Coronal and oblique MIP images of the

vasculature were created to better evaluate anatomy.  



COMPARISON: None.



FINDINGS:

There is contrast bolus within the left subclavian vein, left

innominate vein, and superior vena cava which causes streak

artifact obscuring portions of the aortic arch and great vessels.

Visualized portions of the right subclavian artery, bilateral CCA

and bilateral cervical vertebral arteries are patent. The right

subclavian vein has no contrast within it to evaluate.



There are partially occlusive intravascular thrombi seen within

the posterior medial subsegmental branch of the right lower lobe

pulmonary artery. There is also a partially occlusive

intravascular thrombus within the anterior subsegmental branch of

the left lower lobe. There is no thoracic aortic dissection or

aneurysm. 



There is mild dependent atelectasis involving the posterior

aspects of both lungs. There is a 6 mm soft tissue nodule

involving the lateral aspect of right lower lobe seen on series

4, image 84. Otherwise, the remainder of the lungs are clear.

Pulmonary bronchi are unremarkable. The thyroid gland is

unremarkable as visualized. There is no significant mediastinal

or hilar lymphadenopathy. There is no axillary lymphadenopathy.

Bilateral subglandular breast implants are seen.



The visualized portions of the upper abdominal structures are

unremarkable. Bone show no significant acute process.



IMPRESSION:

1:  There are partially occlusive pulmonary emboli/intravascular

thrombus within the subsegmental branches of the right and left

lower lobes.



2: There is a 6 mm nodule within the lateral aspect of the right

lower lobe. Followup chest CT scan in six months is suggested to

evaluate for stability or resolution.



Results of this report were discussed with Dr. Edson Adame via

the telephone on 12/23/2017 at 1245 hrs.



CRITICAL FINDING









Dictated by: 



  Dictated on workstation # [id number]

## 2017-12-23 NOTE — XMS REPORT
Continuity of Care Document

 Created on: 2017



JANET ANATOLY

External Reference #: [id number]

: 1965

Sex: Female



Demographics







 Preferred Language  Unknown

 

 Marital Status  Unknown

 

 Tenriism Affiliation  Unknown

 

 Race  Unknown

 

 Ethnic Group  Unknown





Author







 Author  [facility]

 

 Address  Unknown

 

 Phone  Unavailable



              



Allergies

      





 Active            Description            Code            Type            
Severity            Reaction            Onset            Reported/Identified   
         Relationship to Patient            Clinical Status        

 

 Yes            Penicillins            [id number]            Drug Allergy      
      Unknown            RASH                         2012               
                   



                  



Medications

      



There is no data.                  



Problems

      





 Date Dx Coded            Attending            Type            Code            
Diagnosis            Diagnosed By        

 

 2009                                      309.0            AD ADJ D/O W 
DEPRESSED                     

 

 2009                                      309.0            AD ADJ D/O W 
DEPRESSED                     

 

 2012                         Ot            626.2            EXCESSIVE 
MENSTRUATION                     

 

 2013                                      309.28            AD ADJ D/O W 
ANX DEP MOOD                     

 

 2013                                      309.28            AD ADJ D/O W 
ANX DEP MOOD                     

 

 2015            JEFFERSON MUSTAFA            Ot            704.1         
                         

 

 2015            JEFFERSON MUSTAFA            Ot            782.62        
                          

 

 2015            JEFFERSON MUSTAFA            Ot            704.1         
                         

 

 2015            JEFFERSON MUSTAFA            Ot            782.62        
                          

 

 2016                         Ot            626.2            EXCESSIVE 
MENSTRUATION                     

 

 2016                         Ot            791.9            ABN URINE 
FINDINGS NEC                     

 

 2016                         Ot            V72.83            EXAM PRE-
OPERATIVE NEC                     

 

 2016            POLLY MORROW            Ot            616.0        
    CERVICITIS                     

 

 2016            POLLY MORROW            Ot            789.03       
     ABDOMINAL PAIN, RIGHT LOWER QUADRANT                     

 

 2016            DAYSI CHOW, LUH HUNTLEY            Ot            478.19     
       OTHER DISEASE OF NASAL CAVITY AND SINUSE                     

 

 2016            JEFFERSON MUSTAFA            Ot            704.1         
   HIRSUTISM                     

 

 2016            JEFFERSON MUSTAFA            Ot            782.62        
    FLUSHING                     

 

 2016            CATARINO SHERMAN            Ot            E01.0
            IODINE-DEFICIENCY RELATED DIFFUSE (ENDEM                     

 

 2017            CATARINO SHERMAN            Ot            
M79.661            PAIN IN RIGHT LOWER LEG                     

 

 2017            POLLY MORROW            Ot            N85.2        
    HYPERTROPHY OF UTERUS                     

 

 2017            POLLY MORROW            Ot            R10.31       
     RIGHT LOWER QUADRANT PAIN                     



                                                          



Procedures

      





 Code            Description            Performed By            Performed On   
     

 

             15151                                  PSYCH DIAGNOSTIC EVALUATION
                                   2013        

 

             91932                                  PSYTX PT&/FAMILY 45 MINUTES
                                   2013        



                    



Results

      



There is no data.              



Encounters

      





 ACCT No.            Visit Date/Time            Discharge            Status    
        Pt. Type            Provider            Facility            Loc./Unit  
          Complaint        

 

 [id number]            2013 14:59:00            2013 23:59:59          
  [facility]            Outpatient                                                    
        

 

 [id number]            2013 14:57:00                                      
Document Registration                                                          
  

 

 [id number]            2017 10:58:00            2017 23:59:59    
        [facility]            Outpatient            POLLY MORROW ARNP            [facility] 
[facility]            RAD            RLQ ABD PAIN        

 

 [id number]            06/15/2017 14:12:00            06/15/2017 23:59:59    
        [facility]            Outpatient            CATARINO SHERMAN ARNP         
   [facility]            RAD            CALF PAIN        

 

 [id number]            2016 08:07:00            2016 23:59:59    
        [facility]            Outpatient            CATARINO SHERMAN ARNP         
   [facility]            RAD            THYROMEGALY      
  

 

 [id number]            2015 14:32:00            2015 23:59:59    
        [facility]            Outpatient            JEFFERSON MUSTAFA            [facility] 
[facility]            LAB            HOT FLASHES,HIRSUTISM   
     

 

 [id number]            2014 16:05:00            2014 23:59:59    
        CLS            Outpatient            LUH TAVAREZ MD            [facility] 
[facility]            RAD            FRONTAL SINUS PAIN AND 
PRESSURE        

 

 [id number]            2014 10:54:00            2014 23:59:59    
        CLS            Outpatient            POLLY MORROW ARNP            [facility] 
[facility]            RAD            RLQ PAIN        

 

 [id number]            2012 05:38:00                                   
   Document Registration                                                       
     

 

 [id number]            2012 08:00:00                                   
   Document Registration

## 2017-12-30 ENCOUNTER — HOSPITAL ENCOUNTER (EMERGENCY)
Dept: HOSPITAL 75 - ER | Age: 52
Discharge: HOME | End: 2017-12-30
Payer: COMMERCIAL

## 2017-12-30 VITALS — SYSTOLIC BLOOD PRESSURE: 114 MMHG | DIASTOLIC BLOOD PRESSURE: 83 MMHG

## 2017-12-30 VITALS — WEIGHT: 120 LBS | HEIGHT: 65 IN | BODY MASS INDEX: 19.99 KG/M2

## 2017-12-30 DIAGNOSIS — Z86.718: ICD-10-CM

## 2017-12-30 DIAGNOSIS — R07.89: Primary | ICD-10-CM

## 2017-12-30 DIAGNOSIS — I26.99: ICD-10-CM

## 2017-12-30 DIAGNOSIS — Z79.01: ICD-10-CM

## 2017-12-30 DIAGNOSIS — Z90.89: ICD-10-CM

## 2017-12-30 LAB
ALBUMIN SERPL-MCNC: 3.8 GM/DL (ref 3.2–4.5)
ALP SERPL-CCNC: 46 U/L (ref 40–136)
ALT SERPL-CCNC: 93 U/L (ref 0–55)
APTT BLD: 32 SEC (ref 24–35)
BASOPHILS # BLD AUTO: 0 10^3/UL (ref 0–0.1)
BASOPHILS NFR BLD AUTO: 1 % (ref 0–10)
BILIRUB SERPL-MCNC: 0.3 MG/DL (ref 0.1–1)
BUN/CREAT SERPL: 24
CALCIUM SERPL-MCNC: 9 MG/DL (ref 8.5–10.1)
CHLORIDE SERPL-SCNC: 107 MMOL/L (ref 98–107)
CO2 SERPL-SCNC: 24 MMOL/L (ref 21–32)
CREAT SERPL-MCNC: 0.75 MG/DL (ref 0.6–1.3)
EOSINOPHIL # BLD AUTO: 0.1 10^3/UL (ref 0–0.3)
EOSINOPHIL NFR BLD AUTO: 2 % (ref 0–10)
ERYTHROCYTE [DISTWIDTH] IN BLOOD BY AUTOMATED COUNT: 11.3 % (ref 10–14.5)
GFR SERPLBLD BASED ON 1.73 SQ M-ARVRAT: > 60 ML/MIN
GLUCOSE SERPL-MCNC: 88 MG/DL (ref 70–105)
HCT VFR BLD CALC: 40 % (ref 35–52)
HGB BLD-MCNC: 14.1 G/DL (ref 11.5–16)
INR PPP: 0.9 (ref 0.8–1.4)
LYMPHOCYTES # BLD AUTO: 2.2 X 10^3 (ref 1–4)
LYMPHOCYTES NFR BLD AUTO: 36 % (ref 12–44)
MAGNESIUM SERPL-MCNC: 2.2 MG/DL (ref 1.8–2.4)
MANUAL DIFFERENTIAL PERFORMED BLD QL: NO
MCH RBC QN AUTO: 33 PG (ref 25–34)
MCHC RBC AUTO-ENTMCNC: 36 G/DL (ref 32–36)
MCV RBC AUTO: 92 FL (ref 80–99)
MONOCYTES # BLD AUTO: 0.7 X 10^3 (ref 0–1)
MONOCYTES NFR BLD AUTO: 11 % (ref 0–12)
MYOGLOBIN SERPL-MCNC: 23.1 NG/ML (ref 10–92)
NEUTROPHILS # BLD AUTO: 3.1 X 10^3 (ref 1.8–7.8)
NEUTROPHILS NFR BLD AUTO: 51 % (ref 42–75)
PLATELET # BLD: 286 10^3/UL (ref 130–400)
PMV BLD AUTO: 8.9 FL (ref 7.4–10.4)
POTASSIUM SERPL-SCNC: 4.1 MMOL/L (ref 3.6–5)
PROT SERPL-MCNC: 6.3 GM/DL (ref 6.4–8.2)
PROTHROMBIN TIME: 12.6 SEC (ref 12.2–14.7)
RBC # BLD AUTO: 4.28 10^6/UL (ref 4.35–5.85)
SODIUM SERPL-SCNC: 140 MMOL/L (ref 135–145)
WBC # BLD AUTO: 6.2 10^3/UL (ref 4.3–11)

## 2017-12-30 PROCEDURE — 84484 ASSAY OF TROPONIN QUANT: CPT

## 2017-12-30 PROCEDURE — 94640 AIRWAY INHALATION TREATMENT: CPT

## 2017-12-30 PROCEDURE — 93041 RHYTHM ECG TRACING: CPT

## 2017-12-30 PROCEDURE — 83735 ASSAY OF MAGNESIUM: CPT

## 2017-12-30 PROCEDURE — 85025 COMPLETE CBC W/AUTO DIFF WBC: CPT

## 2017-12-30 PROCEDURE — 71010: CPT

## 2017-12-30 PROCEDURE — 80053 COMPREHEN METABOLIC PANEL: CPT

## 2017-12-30 PROCEDURE — 85610 PROTHROMBIN TIME: CPT

## 2017-12-30 PROCEDURE — 94664 DEMO&/EVAL PT USE INHALER: CPT

## 2017-12-30 PROCEDURE — 85730 THROMBOPLASTIN TIME PARTIAL: CPT

## 2017-12-30 PROCEDURE — 83874 ASSAY OF MYOGLOBIN: CPT

## 2017-12-30 PROCEDURE — 36415 COLL VENOUS BLD VENIPUNCTURE: CPT

## 2017-12-30 NOTE — ED CHEST PAIN
General


Chief Complaint:  Respiratory Problems


Stated Complaint:  SOB


Nursing Triage Note:  


PT CO OF SOA, PT HAS RECENT HX OF PE. PT IS CURRENTLY ON LOVENOX AND ELAQUIS. 


STATES HAS SUDDEN ONSET OF SOA AND SL CHEST PAIN TODAY. PT STATES HAS BEEN OFF 


HORMONE THERAPY AND HAS NOT TAKEN ANY RECENT TRIPS SINCE BEFORE 


Nursing Sepsis Screen:  No Definite Risk


Source:  patient


Exam Limitations:  no limitations





History of Present Illness


Time seen by provider:  18:12


Initial Comments


This 52-year-old woman presents to the emergency room with complaints of chest 

tightness and a little shortness of breath.  She was diagnosed with a right 

subclavian DVT on  after noting swelling in the arm.  Diagnosis was 

made by ultrasound.  On  she developed chest pain, back pain, and 

shortness of breath.  She presented to the ER and pulmonary emboli were 

identified on CT scan.  Patient had been started on Xarelto after the DVT was 

noted on .  Because there were concerns that the PE may have 

developed after starting Xarelto, she was changed to Eliquis in combination 

with Lovenox.  She continues on these medications and reports strict compliance 

with no missed doses.  Vital signs are within normal limits.  Patient's pain is 

somewhat pleuritic in nature.  It worsens a little bit with deep inspiration.  

Patient reports symptoms improved significantly after the  visit, 

and the symptoms she is experiencing today have not been present the past 

couple of days.  Patient has no known history of hypercoagulable conditions but 

does have a vague history of nondescript autoimmune disease.  Patient also had 

recent travel out of the country around the Thanksgiving holiday.  She denies 

any history of cardiac disease but has never had a cardiac workup such as 

stress test or heart catheter.





Allergies and Home Medications


Allergies


Coded Allergies:  


     Penicillins (Unverified  Allergy, Unknown, RASH, 17)





Home Medications


Alprazolam 0.25 Mg Tablet, (Reported)


Apixaban 5 Mg Tablet, 10 MG PO BID for 7 Days, #28 Ref 0


   2 TABLETS TWICE DAILY X 7 DAYS 


   Prescribed by: PAULA GEIGER on 17 1524


Dextroamphetamine/Amphetamine 20 Mg Tablet, (Reported)


Enoxaparin Sodium 60 Mg/0.6 Ml Syringe, 55 MG SQ Q12H for 14 Days, #28 Ref 0


   Prescribed by: PAULA GEIGER on 17 1524


Loratadine/Pseudoephedrine 1 Each Tab.er.24h, (Reported)


Thyroid,Pork 60 Mg Tablet, (Reported)





Review of Systems


Constitutional:  no symptoms reported


EENTM:  No Symptoms Reported


Respiratory:  See HPI


Cardiovascular:  See HPI


Gastrointestinal:  No Symptoms Reported


Genitourinary:  No Symptoms Reported


Musculoskeletal:  no symptoms reported


Skin:  no symptoms reported


Psychiatric/Neurological:  No Symptoms Reported


Endocrine:  No Symptoms Reported


Hematologic/Lymphatic:  No Symptoms Reported





Past Medical-Social-Family Hx


Patient Social History


Alcohol Use:  Denies Use


Recreational Drug Use:  No


Smoking Status:  Never a Smoker


Recent Foreign Travel:  No


Contact w/Someone Who Travel:  No


Recent Infectious Disease Expo:  No


Recent Hopitalizations:  Yes


Physical Abuse:  No


Sexual Abuse:  No





Immunizations Up To Date


Date of Influenza Vaccine:  2011





Seasonal Allergies


Seasonal Allergies:  No





Surgeries


History of Surgeries:  Yes


Surgeries:  Adenoidectomy, Orthopedic, Tonsillectomy





Respiratory


History of Respiratory Disorde:  Yes


Respiratory Disorders:  Pulmonary Embolism





Cardiovascular


History of Cardiac Disorders:  Yes


Cardiac Disorders:  Deep Vein Thrombosis





Neurological


History of Neurological Disord:  No





Reproductive System


Hx Reproductive Disorders:  Yes (MENORRHAGIA)





Genitourinary


History of Genitourinary Disor:  No





Gastrointestinal


History of Gastrointestinal Di:  No





Musculoskeletal


History of Musculoskeletal Dis:  Yes (CONNECTIVE TISSUE DISEASE)





Endocrine


History of Endocrine Disorders:  Yes (HASHIMOTOS)





HEENT


History of HEENT Disorders:  No





Cancer


History of Cancer:  No





Psychosocial


History of Psychiatric Problem:  No


Suicide Risk Score:  0





Integumentary


History of Skin or Integumenta:  No





Blood Transfusions


History of Blood Disorders:  No





Physical Exam


Vital Signs





Vital Sign - Last 12Hours








 17





 17:57 19:14


 


Temp 98.1 


 


Pulse 72 


 


Resp 18 


 


B/P (MAP) 111/81 (91) 


 


Pulse Ox 100 


 


O2 Delivery  Room Air





Capillary Refill : Less Than 3 Seconds


General Appearance:  No Apparent Distress, WD/WN, Thin


HEENT:  PERRL/EOMI, Normal ENT Inspection, Pharynx Normal


Neck:  Normal Inspection


Respiratory:  Chest Non Tender, Lungs Clear, Normal Breath Sounds, No Accessory 

Muscle Use, No Respiratory Distress


Cardiovascular:  Regular Rate, Rhythm, No Edema, No Murmur, Normal Peripheral 

Pulses


Gastrointestinal:  Normal Bowel Sounds, Non Tender, Soft


Extremity:  Normal Inspection, No Pedal Edema


Neurologic/Psychiatric:  Alert, Oriented x3, No Motor/Sensory Deficits, Normal 

Mood/Affect, CNs II-XII Norm as Tested


Skin:  Normal Color, Warm/Dry





Progress/Results/Core Measures


Results/Orders


Lab Results





Laboratory Tests








Test


  17


18:20 17


22:15 Range/Units


 


 


White Blood Count


  6.2 


  


  4.3-11.0


10^3/uL


 


Red Blood Count


  4.28 L


  


  4.35-5.85


10^6/uL


 


Hemoglobin 14.1   11.5-16.0  G/DL


 


Hematocrit 40   35-52  %


 


Mean Corpuscular Volume 92   80-99  FL


 


Mean Corpuscular Hemoglobin 33   25-34  PG


 


Mean Corpuscular Hemoglobin


Concent 36 


  


  32-36  G/DL


 


 


Red Cell Distribution Width 11.3   10.0-14.5  %


 


Platelet Count


  286 


  


  130-400


10^3/uL


 


Mean Platelet Volume 8.9   7.4-10.4  FL


 


Neutrophils (%) (Auto) 51   42-75  %


 


Lymphocytes (%) (Auto) 36   12-44  %


 


Monocytes (%) (Auto) 11   0-12  %


 


Eosinophils (%) (Auto) 2   0-10  %


 


Basophils (%) (Auto) 1   0-10  %


 


Neutrophils # (Auto) 3.1   1.8-7.8  X 10^3


 


Lymphocytes # (Auto) 2.2   1.0-4.0  X 10^3


 


Monocytes # (Auto) 0.7   0.0-1.0  X 10^3


 


Eosinophils # (Auto)


  0.1 


  


  0.0-0.3


10^3/uL


 


Basophils # (Auto)


  0.0 


  


  0.0-0.1


10^3/uL


 


Prothrombin Time 12.6   12.2-14.7  SEC


 


INR Comment 0.9   0.8-1.4  


 


Activated Partial


Thromboplast Time 32 


  


  24-35  SEC


 


 


Sodium Level 140   135-145  MMOL/L


 


Potassium Level 4.1   3.6-5.0  MMOL/L


 


Chloride Level 107     MMOL/L


 


Carbon Dioxide Level 24   21-32  MMOL/L


 


Anion Gap 9   5-14  MMOL/L


 


Blood Urea Nitrogen 18   7-18  MG/DL


 


Creatinine


  0.75 


  


  0.60-1.30


MG/DL


 


Estimat Glomerular Filtration


Rate > 60 


  


   


 


 


BUN/Creatinine Ratio 24    


 


Glucose Level 88     MG/DL


 


Calcium Level 9.0   8.5-10.1  MG/DL


 


Magnesium Level 2.2   1.8-2.4  MG/DL


 


Total Bilirubin 0.3   0.1-1.0  MG/DL


 


Aspartate Amino Transf


(AST/SGOT) 63 H


  


  5-34  U/L


 


 


Alanine Aminotransferase


(ALT/SGPT) 93 H


  


  0-55  U/L


 


 


Alkaline Phosphatase 46     U/L


 


Myoglobin


  23.1 


  


  10.0-92.0


NG/ML


 


Troponin I < 0.30  < 0.30  <0.30  NG/ML


 


Total Protein 6.3 L  6.4-8.2  GM/DL


 


Albumin 3.8   3.2-4.5  GM/DL








My Orders





Orders - TIFFANY COTE MD


Ekg Tracing (17 18:17)


Monitor-Rhythm Ecg Trace Only (17 18:17)


Cbc With Automated Diff (17 18:17)


Magnesium (17 18:17)


Chest 1 View, Ap/Pa Only (17 18:17)


Cardiac Profile 1 (17 18:17)


Comprehensive Metabolic Panel (17 18:17)


Myoglobin Serum (17 18:17)


Protime With Inr (17 18:17)


Partial Thromboplastin Time (17 18:17)


O2 (17 18:17)


Saline Lock/Iv-Start (17 18:17)


Albuterol  Pre-Mix Nebs (Rt) (Proventil (17 18:40)


Svn Sm Volume Nebulizer Rt-Rfs (17 18:40)


Troponin I (17 20:34)


Ekg Tracing (17 20:55)





Vital Signs/I&O





Vital Sign - Last 12Hours








 17





 17:57 19:14 23:00


 


Temp 98.1  98.0


 


Pulse 72  71


 


Resp 18  16


 


B/P (MAP) 111/81 (91)  


 


Pulse Ox 100 99 100


 


O2 Delivery  Room Air Room Air














Blood Pressure Mean:  91








Progress Note :  


Progress Note


Workup was unremarkable.  An albuterol treatment was trialed it did not improve 

her chest tightness.  Case was reviewed with Dr. Banerjee who recommended at a 

minimum performing a four-hour cardiac rule out in the emergency room.  

Admission was preferred by Dr. Banerjee but patient preferred the 4 hour rule 

out.  4 hour troponin and EKG were normal.  The patient was ultimately 

dismissed home to outpatient follow-up.  She was advised to seek cardiac 

consultation in the outpatient setting.  Patient took her own supply of Lovenox 

and Eliquis in the ER.





ECG


EKG #1:  


   EKG Time:  17:57


   Rate:  73


   Rhythm:  Normal Sinus


   Intervals:  Normal


   ECG Impression:  Normal


Comment


Normal sinus rhythm with no ST elevation or depression.  No abnormal intervals 

or axis deviation.


EKG #2:  


   EKG Time:  22:38


   Rate:  73


   Rhythm:  Normal Sinus


   Intervals:  Normal


   ECG Comparisson:  Unchanged


   ECG Impression:  Normal


Comment


Normal sinus rhythm with no ST elevation or depression.  No abnormal intervals 

or axis deviation.





Diagnostic Imaging





   Diagonstic Imaging:  Xray


   Plain Films/CT/US/NM/MRI:  chest


Comments


NAME:      ANATOLY GONZALES


MED REC#:   [id number]


ACCOUNT#:   [id number]


PT STATUS:   REG ER


:      1965


PHYSICIAN:    TIFFANY COTE MD


ADMIT DATE:   17/ER


***Signed***


Date of Exam:   17





CHEST 1 VIEW, AP/PA ONLY


 





INDICATION: Shortness of breath. 





COMPARISON: None.





EXAMINATION: Single view of the chest was obtained.





FINDINGS: Clear lungs, bilaterally. The heart is normal. No


pneumothorax. Osseous structures are normal. 





IMPRESSION: Negative chest. 





Dictated by: 





  Dictated on workstation # [id number]





[id number]





Dict:      17


Trans:      17





Interpreted by:         CHANTELL BENDER


Electronically signed by:   CHANTELL BENDER 17





Departure


Impression


Impression:  


 Primary Impression:  


 Atypical chest pain


 Additional Impression:  


 Pulmonary embolism


 Qualified Codes:  I26.99 - Other pulmonary embolism without acute cor pulmonale


Disposition:  01 HOME, SELF-CARE


Condition:  Stable





Departure-Patient Inst.


Decision time for Depature:  22:50


Referrals:  


RONNY SHERMAN DO (PCP)


Primary Care Physician








CATARINO SHERMAN DNP (Family)


Primary Care Physician








[facility]


Patient Instructions:  Pulmonary Embolism (Blood Clot in the Lungs)





Add. Discharge Instructions:  


Follow-up with your primary care provider and a cardiologist as soon as 

possible.  Return to the emergency room if symptoms worsen.  Use Tylenol (

acetaminophen) for pain.








All discharge instructions reviewed with patient and/or family. Voiced 

understanding.





Copy


Copies To 1:   RONNY SHERMAN DO


Copies To 2:   VALENCIA BANERJEE MD [facility]











TIFFANY COTE MD Dec 30, 2017 22:51

## 2017-12-30 NOTE — DIAGNOSTIC IMAGING REPORT
INDICATION: Shortness of breath. 



COMPARISON: None.



EXAMINATION: Single view of the chest was obtained.



FINDINGS: Clear lungs, bilaterally. The heart is normal. No

pneumothorax. Osseous structures are normal. 



IMPRESSION: Negative chest. 



Dictated by: 



  Dictated on workstation # [id number]

## 2017-12-30 NOTE — XMS REPORT
Continuity of Care Document

 Created on: 2017



JANET ANATOLY

External Reference #: [id number]

: 1965

Sex: Female



Demographics







 Preferred Language  Unknown

 

 Marital Status  Unknown

 

 [employer] Affiliation  Unknown

 

 Race  Unknown

 

 Ethnic Group  Unknown





Author







 Author  [facility]

 

 Address  Unknown

 

 Phone  Unavailable



              



Allergies

      





 Active            Description            Code            Type            
Severity            Reaction            Onset            Reported/Identified   
         Relationship to Patient            Clinical Status        

 

 Yes            Penicillins            [id number]            Drug Allergy      
      Unknown            RASH                         2012               
                   



                  



Medications

      



There is no data.                  



Problems

      





 Date Dx Coded            Attending            Type            Code            
Diagnosis            Diagnosed By        

 

 2009                                      309.0            AD ADJ D/O W 
DEPRESSED                     

 

 2009                                      309.0            AD ADJ D/O W 
DEPRESSED                     

 

 2012                         Ot            626.2            EXCESSIVE 
MENSTRUATION                     

 

 2013                                      309.28            AD ADJ D/O W 
ANX DEP MOOD                     

 

 2013                                      309.28            AD ADJ D/O W 
ANX DEP MOOD                     

 

 2015            JEFFERSON MUSTAFA            Ot            704.1         
                         

 

 2015            JEFFERSON MUSTAFA            Ot            782.62        
                          

 

 2015            JEFFERSON MUSTAFA            Ot            704.1         
                         

 

 2015            JEFFERSON MUSTAFA            Ot            782.62        
                          

 

 2016                         Ot            626.2            EXCESSIVE 
MENSTRUATION                     

 

 2016                         Ot            791.9            ABN URINE 
FINDINGS NEC                     

 

 2016                         Ot            V72.83            EXAM PRE-
OPERATIVE NEC                     

 

 2016            POLYL MORROW            Ot            616.0        
    CERVICITIS                     

 

 2016            POLLY MORROW            Ot            789.03       
     ABDOMINAL PAIN, RIGHT LOWER QUADRANT                     

 

 2016            DAYSI CHOW, LUH HUNTLEY            Ot            478.19     
       OTHER DISEASE OF NASAL CAVITY AND SINUSE                     

 

 2016            JEFFERSON MUSTAFA            Ot            704.1         
   HIRSUTISM                     

 

 2016            JEFFERSON MUSTAFA            Ot            782.62        
    FLUSHING                     

 

 2016            CATARINO SHERMAN            Ot            E01.0
            IODINE-DEFICIENCY RELATED DIFFUSE (ENDEM                     

 

 2017            CATARINO SHERMAN            Ot            
M79.661            PAIN IN RIGHT LOWER LEG                     

 

 2017            POLLY MORROW            Ot            N85.2        
    HYPERTROPHY OF UTERUS                     

 

 2017            POLLY MORROW            Ot            R10.31       
     RIGHT LOWER QUADRANT PAIN                     



                                                          



Procedures

      





 Code            Description            Performed By            Performed On   
     

 

             22093                                  PSYCH DIAGNOSTIC EVALUATION
                                   2013        

 

             48424                                  PSYTX PT&/FAMILY 45 MINUTES
                                   2013        



                    



Results

      





 Test            Result            Range        









 Complete blood count (CBC) with automated white blood cell (WBC) differential 
- 17 11:44         









 Blood leukocytes automated count (number/volume)            6.0 10*3/uL       
     4.3-11.0        

 

 Blood erythrocytes automated count (number/volume)            4.45 10*6/uL    
        4.35-5.85        

 

 Venous blood hemoglobin measurement (mass/volume)            14.7 g/dL        
    11.5-16.0        

 

 Blood hematocrit (volume fraction)            42 %            35-52        

 

 Automated erythrocyte mean corpuscular volume            94 [foz_us]          
  80-99        

 

 Automated erythrocyte mean corpuscular hemoglobin (mass per erythrocyte)      
      33 pg            25-34        

 

 Automated erythrocyte mean corpuscular hemoglobin concentration measurement (
mass/volume)            35 g/dL            32-36        

 

 Automated erythrocyte distribution width ratio            11.4 %            
10.0-14.5        

 

 Automated blood platelet count (count/volume)            253 10*3/uL          
  130-400        

 

 Automated blood platelet mean volume measurement            8.9 [foz_us]      
      7.4-10.4        

 

 Automated blood neutrophils/100 leukocytes            66 %            42-75   
     

 

 Automated blood lymphocytes/100 leukocytes            24 %            12-44   
     

 

 Blood monocytes/100 leukocytes            9 %            0-12        

 

 Automated blood eosinophils/100 leukocytes            1 %            0-10     
   

 

 Automated blood basophils/100 leukocytes            0 %            0-10        

 

 Blood neutrophils automated count (number/volume)            4.0 10*3         
   1.8-7.8        

 

 Blood lymphocytes automated count (number/volume)            1.4 10*3         
   1.0-4.0        

 

 Blood monocytes automated count (number/volume)            0.5 10*3            
0.0-1.0        

 

 Automated eosinophil count            0.1 10*3/uL            0.0-0.3        

 

 Automated blood basophil count (count/volume)            0.0 10*3/uL          
  0.0-0.1        









 Comprehensive metabolic panel - 17 11:44         









 Serum or plasma sodium measurement (moles/volume)            142 mmol/L       
     135-145        

 

 Serum or plasma potassium measurement (moles/volume)            4.0 mmol/L    
        3.6-5.0        

 

 Serum or plasma chloride measurement (moles/volume)            106 mmol/L     
               

 

 Carbon dioxide            27 mmol/L            21-32        

 

 Serum or plasma anion gap determination (moles/volume)            9 mmol/L    
        5-14        

 

 Serum or plasma urea nitrogen measurement (mass/volume)            12 mg/dL   
         7-18        

 

 Serum or plasma creatinine measurement (mass/volume)            0.75 mg/dL    
        0.60-1.30        

 

 Serum or plasma urea nitrogen/creatinine mass ratio            16             
NRG        

 

 Serum or plasma creatinine measurement with calculation of estimated 
glomerular filtration rate            >             NRG        

 

 Serum or plasma glucose measurement (mass/volume)            101 mg/dL        
            

 

 Serum or plasma calcium measurement (mass/volume)            9.4 mg/dL        
    8.5-10.1        

 

 Serum or plasma total bilirubin measurement (mass/volume)            0.6 mg/dL
            0.1-1.0        

 

 Serum or plasma alkaline phosphatase measurement (enzymatic activity/volume)  
          52 U/L                    

 

 Serum or plasma aspartate aminotransferase measurement (enzymatic activity/
volume)            27 U/L            5-34        

 

 Serum or plasma alanine aminotransferase measurement (enzymatic activity/volume
)            24 U/L            0-55        

 

 Serum or plasma protein measurement (mass/volume)            6.4 g/dL         
   6.4-8.2        

 

 Serum or plasma albumin measurement (mass/volume)            3.9 g/dL         
   3.2-4.5        









 Serum or plasma troponin i.cardiac measurement (mass/volume) - 17 11:44 
        









 Serum or plasma troponin i.cardiac measurement (mass/volume)            < ng/
mL            <0.30        



                    



Encounters

      





 ACCT No.            Visit Date/Time            Discharge            Status    
        Pt. Type            Provider            Facility            Loc./Unit  
          Complaint        

 

 [id number]            2013 14:59:00            2013 23:59:59          
  [facility]            Outpatient                                                    
        

 

 [id number]            2013 14:57:00                                      
Document Registration                                                          
  

 

 [id number]            2017 10:58:00            2017 23:59:59    
        [facility]            Outpatient            POLLY MORROW ARNP            [facility]ia 
[facility]            RAD            RLQ ABD PAIN        

 

 [id number]            06/15/2017 14:12:00            06/15/2017 23:59:59    
        CLS            Outpatient            CATARINO SHERMAN ARNP         
   [facility]            RAD            CALF PAIN        

 

 [id number]            2016 08:07:00            2016 23:59:59    
        CLS            Outpatient            CATARINO SHERMAN ARNP         
   [facility]            RAD            THYROMEGALY      
  

 

 [id number]            2015 14:32:00            2015 23:59:59    
        CLS            Outpatient            JEFFERSON MUSTAFA            [facility] 
[facility]            LAB            HOT FLASHES,HIRSUTISM   
     

 

 [id number]            2014 16:05:00            2014 23:59:59    
        CLS            Outpatient            DAYSI CHOW, LUH HUNTLEY            [facility] 
[facility]            RAD            FRONTAL SINUS PAIN AND 
PRESSURE        

 

 [id number]            2014 10:54:00            2014 23:59:59    
        CLS            Outpatient            POLLY MORROW            [facility] 
[facility]            RAD            RLQ PAIN        

 

 [id number]            2017 11:50:00                                   
   Document Registration                                                       
     

 

 [id number]            2012 05:38:00                                   
   Document Registration                                                       
     

 

 [id number]            2012 08:00:00                                   
   Document Registration

## 2018-06-15 ENCOUNTER — HOSPITAL ENCOUNTER (OUTPATIENT)
Dept: HOSPITAL 75 - RAD | Age: 53
End: 2018-06-15
Attending: NURSE PRACTITIONER
Payer: COMMERCIAL

## 2018-06-15 DIAGNOSIS — R91.1: Primary | ICD-10-CM

## 2018-06-15 PROCEDURE — 71260 CT THORAX DX C+: CPT

## 2018-06-15 NOTE — DIAGNOSTIC IMAGING REPORT
PROCEDURE: CT chest with contrast only.



TECHNIQUE: Multiple contiguous axial images were obtained through

the chest after administration of intravenous contrast.



INDICATION: Right lower lobe pulmonary nodule. Study is performed

for followup.



Correlation is made with prior CT from 12/23/2017.



No axillary lymphadenopathy is detected. Hilar and mediastinal

lymphadenopathy seen. Previously noted emboli within bilateral

lower lobe branches are not visualized, however, study was not

protocoled as a CT angiogram study. No pericardial or pleural

fluid is detected. Previously described nodular density in the

right lower lobe laterally is less apparent on today's study and

barely visible at approximately 4 mm. This is seen on image 32,

series 2. No other parenchymal abnormalities are identified.

Upper abdomen is unremarkable.



IMPRESSION: Stable to decrease in size of previously noted nodule

in the right lower lobe when compared with exam from 12/23/2017.

No new parenchymal abnormality is seen. No thoracic

lymphadenopathy is detected.



Dictated by: 



  Dictated on workstation # [id number]

## 2018-08-15 ENCOUNTER — APPOINTMENT (RX ONLY)
Dept: URBAN - METROPOLITAN AREA CLINIC 51 | Facility: CLINIC | Age: 53
Setting detail: DERMATOLOGY
End: 2018-08-15

## 2018-08-15 DIAGNOSIS — L82.1 OTHER SEBORRHEIC KERATOSIS: ICD-10-CM

## 2018-08-15 DIAGNOSIS — D22 MELANOCYTIC NEVI: ICD-10-CM

## 2018-08-15 DIAGNOSIS — L81.4 OTHER MELANIN HYPERPIGMENTATION: ICD-10-CM

## 2018-08-15 PROBLEM — F41.9 ANXIETY DISORDER, UNSPECIFIED: Status: ACTIVE | Noted: 2018-08-15

## 2018-08-15 PROBLEM — D48.5 NEOPLASM OF UNCERTAIN BEHAVIOR OF SKIN: Status: ACTIVE | Noted: 2018-08-15

## 2018-08-15 PROBLEM — M12.9 ARTHROPATHY, UNSPECIFIED: Status: ACTIVE | Noted: 2018-08-15

## 2018-08-15 PROBLEM — D22.5 MELANOCYTIC NEVI OF TRUNK: Status: ACTIVE | Noted: 2018-08-15

## 2018-08-15 PROBLEM — E03.9 HYPOTHYROIDISM, UNSPECIFIED: Status: ACTIVE | Noted: 2018-08-15

## 2018-08-15 PROCEDURE — 99202 OFFICE O/P NEW SF 15 MIN: CPT | Mod: 25

## 2018-08-15 PROCEDURE — 11310 SHAVE SKIN LESION 0.5 CM/<: CPT

## 2018-08-15 PROCEDURE — ? COUNSELING

## 2018-08-15 PROCEDURE — ? SHAVE REMOVAL

## 2018-08-15 PROCEDURE — 11310 SHAVE SKIN LESION 0.5 CM/<: CPT | Mod: 76

## 2018-08-15 ASSESSMENT — LOCATION DETAILED DESCRIPTION DERM
LOCATION DETAILED: LEFT INFERIOR LATERAL MIDBACK
LOCATION DETAILED: LEFT PROXIMAL DORSAL FOREARM
LOCATION DETAILED: RIGHT SUBMANDIBULAR AREA
LOCATION DETAILED: RIGHT DISTAL POSTERIOR UPPER ARM
LOCATION DETAILED: LEFT SUBMANDIBULAR AREA

## 2018-08-15 ASSESSMENT — LOCATION SIMPLE DESCRIPTION DERM
LOCATION SIMPLE: LEFT LOWER BACK
LOCATION SIMPLE: LEFT FOREARM
LOCATION SIMPLE: RIGHT POSTERIOR UPPER ARM
LOCATION SIMPLE: RIGHT SUBMANDIBULAR AREA
LOCATION SIMPLE: LEFT SUBMANDIBULAR AREA

## 2018-08-15 ASSESSMENT — LOCATION ZONE DERM
LOCATION ZONE: ARM
LOCATION ZONE: FACE
LOCATION ZONE: TRUNK

## 2018-08-15 NOTE — PROCEDURE: SHAVE REMOVAL
Hemostasis: Aluminum Chloride and Electrocautery
Consent was obtained from the patient. The risks and benefits to therapy were discussed in detail. Specifically, the risks of infection, scarring, bleeding, prolonged wound healing, incomplete removal, allergy to anesthesia, nerve injury and recurrence were addressed. Prior to the procedure, the treatment site was clearly identified and confirmed by the patient. All components of Universal Protocol/PAUSE Rule completed.
Was A Bandage Applied: Yes
X Size Of Lesion In Cm (Optional): 0
Notification Instructions: Patient will be notified of biopsy results. However, patient instructed to call the office if not contacted within 2 weeks.
Render Post-Care Instructions In Note?: no
Anesthesia Volume In Cc: 1
Billing Type: Third-Party Bill
Lab Facility: [id number]
Anesthesia Type: 1% lidocaine without epinephrine
Biopsy Method: Jorje perkins
Detail Level: Detailed
Size Of Lesion In Cm (Required): 0.4
Medical Necessity Information: It is in your best interest to select a reason for this procedure from the list below. All of these items fulfill various CMS LCD requirements except the new and changing color options.
Wound Care: Aquaphor
Post-Care Instructions: I reviewed with the patient in detail post-care instructions. Patient is to keep the biopsy site dry overnight, and then apply bacitracin twice daily until healed. Patient may apply hydrogen peroxide soaks to remove any crusting.
Medical Necessity Clause: This procedure was medically necessary because the lesion that was treated due to being irritated by clothing.
Lab: [id number]
Lab: [id number]
Lab Facility: [id number]
Billing Type: Third-Party Bill

## 2018-10-19 ENCOUNTER — HOSPITAL ENCOUNTER (OUTPATIENT)
Dept: HOSPITAL 75 - RAD | Age: 53
End: 2018-10-19
Attending: OTOLARYNGOLOGY
Payer: COMMERCIAL

## 2018-10-19 DIAGNOSIS — J32.9: Primary | ICD-10-CM

## 2018-10-19 PROCEDURE — 70486 CT MAXILLOFACIAL W/O DYE: CPT

## 2018-10-19 NOTE — DIAGNOSTIC IMAGING REPORT
PROCEDURE: CT sinuses without contrast



TECHNIQUE: Multiple contiguous axial images were obtained through

the sinuses without the use of intravenous contrast. Coronal and

sagittal reformations were then performed.



INDICATION: Chronic sinusitis.



COMPARISON: Correlation is made with prior sinus CT from

06/25/2014.



FINDINGS: Frontal sinus is clear. Ethmoid air cells and sphenoid

sinus are clear. Bilateral maxillary sinuses are clear.

Ostiomeatal complexes are patent. The nasal septum is midline.

Mastoid air cells are well aerated.



IMPRESSION: No evidence of sinusitis.



Dictated by: 



  Dictated on workstation # [id number]

## 2020-05-19 ENCOUNTER — APPOINTMENT (RX ONLY)
Dept: URBAN - METROPOLITAN AREA CLINIC 51 | Facility: CLINIC | Age: 55
Setting detail: DERMATOLOGY
End: 2020-05-19

## 2020-05-19 DIAGNOSIS — L72.0 EPIDERMAL CYST: ICD-10-CM

## 2020-05-19 PROCEDURE — ? TREATMENT REGIMEN

## 2020-05-19 PROCEDURE — 99212 OFFICE O/P EST SF 10 MIN: CPT

## 2020-05-19 PROCEDURE — ? COUNSELING

## 2020-05-19 ASSESSMENT — LOCATION DETAILED DESCRIPTION DERM
LOCATION DETAILED: RIGHT SUPERIOR CENTRAL MALAR CHEEK
LOCATION DETAILED: LEFT SUPERIOR CENTRAL MALAR CHEEK

## 2020-05-19 ASSESSMENT — LOCATION ZONE DERM: LOCATION ZONE: FACE

## 2020-05-19 ASSESSMENT — LOCATION SIMPLE DESCRIPTION DERM
LOCATION SIMPLE: RIGHT CHEEK
LOCATION SIMPLE: LEFT CHEEK

## 2020-05-19 NOTE — PROCEDURE: TREATMENT REGIMEN
Plan: Patient directed to use Retin A every 3rd night. Patient has allergic shiners as an associated diagnosis. Patient directed to use Flonase
Detail Level: Zone

## 2023-06-01 ENCOUNTER — HOSPITAL ENCOUNTER (OUTPATIENT)
Dept: HOSPITAL 75 - RAD | Age: 58
End: 2023-06-01
Attending: NURSE PRACTITIONER
Payer: COMMERCIAL

## 2023-06-01 DIAGNOSIS — S83.242A: ICD-10-CM
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
## 2023-06-01 DIAGNOSIS — M25.362: ICD-10-CM

## 2023-06-01 DIAGNOSIS — S83.412A: Primary | ICD-10-CM

## 2023-06-01 PROCEDURE — 73721 MRI JNT OF LWR EXTRE W/O DYE: CPT

## 2023-06-01 NOTE — DIAGNOSTIC IMAGING REPORT
PROCEDURE: MRI left joint lower extremity without contrast.



TECHNIQUE: Multiplanar, multisequence non contrast-enhanced MRI

of the left lower extremity was accomplished.



INDICATION:  Trouble walking. Pain prior scope 2011.



EXAMINATION: Left lower extremity MRI without contrast

06/01/2023.



COMPARISON: None.



FINDINGS:



The extensor mechanism is intact. However, there is a focal area

of thickening within the mid patellar tendon with overlying

susceptibility artifact suggesting likely postoperative change.

No surrounding edema within the tendon is noted. Nonspecific

subcutaneous edema seen throughout the anterior knee.



ACL and PCL intact. Lateral collateral ligamentous complex

intact. MCL intact, however there is surrounding edema especially

medial to the MCL suggesting a sprain.



The lateral meniscus is intact. Diffuse abnormal signal intensity

seen throughout the posterior horn of the medial meniscus

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

be extension down to the tibial surface consistent with a tear.



Mild thinning of the cartilage in the medial compartment is

noted. There is minimal thinning of the cartilage with associated

spurring along the lateral joint space. There is severe loss of

cartilage over the lateral patellar facet with underlying

subchondral cystic change.



There is a large joint effusion. There is a moderate-sized Baker

cyst.



IMPRESSION:

1. MCL sprain with remaining ligaments and tendons intact.

Chronic findings at the patellar tendon.

2. Tear of the posterior horn of the medial meniscus.

3. Degenerative findings and incidental changes as above.



Dictated by: 



  Dictated on workstation # [id number]